# Patient Record
Sex: MALE | Race: WHITE | NOT HISPANIC OR LATINO | Employment: FULL TIME | URBAN - METROPOLITAN AREA
[De-identification: names, ages, dates, MRNs, and addresses within clinical notes are randomized per-mention and may not be internally consistent; named-entity substitution may affect disease eponyms.]

---

## 2017-01-04 ENCOUNTER — COMMUNICATION - HEALTHEAST (OUTPATIENT)
Dept: FAMILY MEDICINE | Facility: CLINIC | Age: 52
End: 2017-01-04

## 2017-02-04 ENCOUNTER — COMMUNICATION - HEALTHEAST (OUTPATIENT)
Dept: FAMILY MEDICINE | Facility: CLINIC | Age: 52
End: 2017-02-04

## 2017-02-08 ENCOUNTER — COMMUNICATION - HEALTHEAST (OUTPATIENT)
Dept: FAMILY MEDICINE | Facility: CLINIC | Age: 52
End: 2017-02-08

## 2017-03-20 ENCOUNTER — AMBULATORY - HEALTHEAST (OUTPATIENT)
Dept: FAMILY MEDICINE | Facility: CLINIC | Age: 52
End: 2017-03-20

## 2017-03-20 ENCOUNTER — OFFICE VISIT - HEALTHEAST (OUTPATIENT)
Dept: FAMILY MEDICINE | Facility: CLINIC | Age: 52
End: 2017-03-20

## 2017-03-20 ENCOUNTER — RECORDS - HEALTHEAST (OUTPATIENT)
Dept: GENERAL RADIOLOGY | Facility: CLINIC | Age: 52
End: 2017-03-20

## 2017-03-20 ENCOUNTER — COMMUNICATION - HEALTHEAST (OUTPATIENT)
Dept: FAMILY MEDICINE | Facility: CLINIC | Age: 52
End: 2017-03-20

## 2017-03-20 DIAGNOSIS — R05.9 COUGH: ICD-10-CM

## 2017-03-20 DIAGNOSIS — R06.2 WHEEZING: ICD-10-CM

## 2017-03-20 DIAGNOSIS — R07.81 RIB PAIN: ICD-10-CM

## 2017-03-20 DIAGNOSIS — R07.81 PLEURODYNIA: ICD-10-CM

## 2017-03-20 ASSESSMENT — MIFFLIN-ST. JEOR: SCORE: 1460.39

## 2017-04-04 ENCOUNTER — OFFICE VISIT - HEALTHEAST (OUTPATIENT)
Dept: FAMILY MEDICINE | Facility: CLINIC | Age: 52
End: 2017-04-04

## 2017-04-04 DIAGNOSIS — M54.2 NECK PAIN: ICD-10-CM

## 2017-04-04 DIAGNOSIS — M89.8X1 PAIN OF RIGHT SCAPULA: ICD-10-CM

## 2017-04-04 DIAGNOSIS — Z00.00 HEALTH CARE MAINTENANCE: ICD-10-CM

## 2017-04-04 ASSESSMENT — MIFFLIN-ST. JEOR: SCORE: 1469.46

## 2017-05-22 ENCOUNTER — COMMUNICATION - HEALTHEAST (OUTPATIENT)
Dept: FAMILY MEDICINE | Facility: CLINIC | Age: 52
End: 2017-05-22

## 2017-05-22 DIAGNOSIS — M89.8X1 PAIN OF RIGHT SCAPULA: ICD-10-CM

## 2017-07-07 ENCOUNTER — COMMUNICATION - HEALTHEAST (OUTPATIENT)
Dept: FAMILY MEDICINE | Facility: CLINIC | Age: 52
End: 2017-07-07

## 2017-08-02 ENCOUNTER — COMMUNICATION - HEALTHEAST (OUTPATIENT)
Dept: FAMILY MEDICINE | Facility: CLINIC | Age: 52
End: 2017-08-02

## 2017-08-02 DIAGNOSIS — M12.9 ARTHROPATHY: ICD-10-CM

## 2017-08-03 ENCOUNTER — OFFICE VISIT - HEALTHEAST (OUTPATIENT)
Dept: FAMILY MEDICINE | Facility: CLINIC | Age: 52
End: 2017-08-03

## 2017-08-03 DIAGNOSIS — M25.512 LEFT SHOULDER PAIN: ICD-10-CM

## 2017-08-03 DIAGNOSIS — M54.2 CERVICALGIA: ICD-10-CM

## 2017-08-03 DIAGNOSIS — M89.8X1 PAIN OF RIGHT SCAPULA: ICD-10-CM

## 2017-10-09 ENCOUNTER — COMMUNICATION - HEALTHEAST (OUTPATIENT)
Dept: FAMILY MEDICINE | Facility: CLINIC | Age: 52
End: 2017-10-09

## 2017-10-09 DIAGNOSIS — M12.9 ARTHROPATHY: ICD-10-CM

## 2017-12-17 ENCOUNTER — COMMUNICATION - HEALTHEAST (OUTPATIENT)
Dept: FAMILY MEDICINE | Facility: CLINIC | Age: 52
End: 2017-12-17

## 2017-12-17 DIAGNOSIS — M12.9 ARTHROPATHY: ICD-10-CM

## 2018-02-17 ENCOUNTER — COMMUNICATION - HEALTHEAST (OUTPATIENT)
Dept: FAMILY MEDICINE | Facility: CLINIC | Age: 53
End: 2018-02-17

## 2018-02-17 DIAGNOSIS — M12.9 ARTHROPATHY: ICD-10-CM

## 2018-02-17 DIAGNOSIS — M89.8X1 PAIN OF RIGHT SCAPULA: ICD-10-CM

## 2018-04-12 ENCOUNTER — COMMUNICATION - HEALTHEAST (OUTPATIENT)
Dept: FAMILY MEDICINE | Facility: CLINIC | Age: 53
End: 2018-04-12

## 2018-04-12 ENCOUNTER — RECORDS - HEALTHEAST (OUTPATIENT)
Dept: GENERAL RADIOLOGY | Facility: CLINIC | Age: 53
End: 2018-04-12

## 2018-04-12 ENCOUNTER — OFFICE VISIT - HEALTHEAST (OUTPATIENT)
Dept: FAMILY MEDICINE | Facility: CLINIC | Age: 53
End: 2018-04-12

## 2018-04-12 DIAGNOSIS — Z12.11 SCREEN FOR COLON CANCER: ICD-10-CM

## 2018-04-12 DIAGNOSIS — R05.9 COUGH: ICD-10-CM

## 2018-04-12 DIAGNOSIS — R19.7 DIARRHEA, UNSPECIFIED TYPE: ICD-10-CM

## 2018-04-12 DIAGNOSIS — R10.13 EPIGASTRIC PAIN: ICD-10-CM

## 2018-04-12 DIAGNOSIS — R06.2 WHEEZING: ICD-10-CM

## 2018-04-12 LAB
ALBUMIN SERPL-MCNC: 4 G/DL (ref 3.5–5)
ALP SERPL-CCNC: 89 U/L (ref 45–120)
ALT SERPL W P-5'-P-CCNC: 19 U/L (ref 0–45)
ANION GAP SERPL CALCULATED.3IONS-SCNC: 11 MMOL/L (ref 5–18)
AST SERPL W P-5'-P-CCNC: 26 U/L (ref 0–40)
BILIRUB SERPL-MCNC: 1 MG/DL (ref 0–1)
BUN SERPL-MCNC: 13 MG/DL (ref 8–22)
CALCIUM SERPL-MCNC: 9.6 MG/DL (ref 8.5–10.5)
CHLORIDE BLD-SCNC: 102 MMOL/L (ref 98–107)
CO2 SERPL-SCNC: 23 MMOL/L (ref 22–31)
CREAT SERPL-MCNC: 1.24 MG/DL (ref 0.7–1.3)
ERYTHROCYTE [DISTWIDTH] IN BLOOD BY AUTOMATED COUNT: 12.8 % (ref 11–14.5)
FLUAV AG SPEC QL IA: NORMAL
FLUBV AG SPEC QL IA: NORMAL
GFR SERPL CREATININE-BSD FRML MDRD: >60 ML/MIN/1.73M2
GLUCOSE BLD-MCNC: 96 MG/DL (ref 70–125)
HCT VFR BLD AUTO: 44.5 % (ref 40–54)
HGB BLD-MCNC: 15.1 G/DL (ref 14–18)
LIPASE SERPL-CCNC: 59 U/L (ref 0–52)
MCH RBC QN AUTO: 31 PG (ref 27–34)
MCHC RBC AUTO-ENTMCNC: 33.9 G/DL (ref 32–36)
MCV RBC AUTO: 92 FL (ref 80–100)
PLATELET # BLD AUTO: 156 THOU/UL (ref 140–440)
PMV BLD AUTO: 7.7 FL (ref 7–10)
POTASSIUM BLD-SCNC: 4.7 MMOL/L (ref 3.5–5)
PROT SERPL-MCNC: 7.3 G/DL (ref 6–8)
RBC # BLD AUTO: 4.86 MILL/UL (ref 4.4–6.2)
SODIUM SERPL-SCNC: 136 MMOL/L (ref 136–145)
WBC: 4 THOU/UL (ref 4–11)

## 2018-04-12 ASSESSMENT — MIFFLIN-ST. JEOR: SCORE: 1449.96

## 2018-04-16 ENCOUNTER — COMMUNICATION - HEALTHEAST (OUTPATIENT)
Dept: FAMILY MEDICINE | Facility: CLINIC | Age: 53
End: 2018-04-16

## 2018-04-16 ENCOUNTER — AMBULATORY - HEALTHEAST (OUTPATIENT)
Dept: FAMILY MEDICINE | Facility: CLINIC | Age: 53
End: 2018-04-16

## 2018-04-16 DIAGNOSIS — R74.8 ELEVATED LIPASE: ICD-10-CM

## 2018-04-16 DIAGNOSIS — R10.13 EPIGASTRIC PAIN: ICD-10-CM

## 2018-04-17 ENCOUNTER — COMMUNICATION - HEALTHEAST (OUTPATIENT)
Dept: FAMILY MEDICINE | Facility: CLINIC | Age: 53
End: 2018-04-17

## 2018-04-18 ENCOUNTER — COMMUNICATION - HEALTHEAST (OUTPATIENT)
Dept: FAMILY MEDICINE | Facility: CLINIC | Age: 53
End: 2018-04-18

## 2018-04-18 ENCOUNTER — OFFICE VISIT - HEALTHEAST (OUTPATIENT)
Dept: FAMILY MEDICINE | Facility: CLINIC | Age: 53
End: 2018-04-18

## 2018-04-18 DIAGNOSIS — K85.90 PANCREATITIS: ICD-10-CM

## 2018-04-18 DIAGNOSIS — J45.909 ASTHMA: ICD-10-CM

## 2018-04-23 ENCOUNTER — HOSPITAL ENCOUNTER (OUTPATIENT)
Dept: CT IMAGING | Facility: CLINIC | Age: 53
Discharge: HOME | End: 2018-04-23
Attending: NURSE PRACTITIONER

## 2018-04-23 DIAGNOSIS — R74.8 ELEVATED LIPASE: ICD-10-CM

## 2018-04-23 DIAGNOSIS — R10.13 EPIGASTRIC PAIN: ICD-10-CM

## 2018-04-23 LAB — CREAT BLD-MCNC: 0.9 MG/DL

## 2018-04-30 ENCOUNTER — COMMUNICATION - HEALTHEAST (OUTPATIENT)
Dept: FAMILY MEDICINE | Facility: CLINIC | Age: 53
End: 2018-04-30

## 2018-04-30 DIAGNOSIS — M12.9 ARTHROPATHY: ICD-10-CM

## 2018-05-14 ENCOUNTER — AMBULATORY - HEALTHEAST (OUTPATIENT)
Dept: FAMILY MEDICINE | Facility: CLINIC | Age: 53
End: 2018-05-14

## 2018-05-14 ENCOUNTER — COMMUNICATION - HEALTHEAST (OUTPATIENT)
Dept: FAMILY MEDICINE | Facility: CLINIC | Age: 53
End: 2018-05-14

## 2018-05-14 DIAGNOSIS — M79.673 FOOT PAIN: ICD-10-CM

## 2018-05-14 DIAGNOSIS — K90.0 CELIAC DISEASE: ICD-10-CM

## 2018-05-24 ENCOUNTER — RECORDS - HEALTHEAST (OUTPATIENT)
Dept: ADMINISTRATIVE | Facility: OTHER | Age: 53
End: 2018-05-24

## 2018-05-25 ENCOUNTER — RECORDS - HEALTHEAST (OUTPATIENT)
Dept: ADMINISTRATIVE | Facility: OTHER | Age: 53
End: 2018-05-25

## 2018-05-29 ENCOUNTER — RECORDS - HEALTHEAST (OUTPATIENT)
Dept: ADMINISTRATIVE | Facility: OTHER | Age: 53
End: 2018-05-29

## 2018-06-04 ENCOUNTER — COMMUNICATION - HEALTHEAST (OUTPATIENT)
Dept: FAMILY MEDICINE | Facility: CLINIC | Age: 53
End: 2018-06-04

## 2018-06-19 ENCOUNTER — RECORDS - HEALTHEAST (OUTPATIENT)
Dept: ADMINISTRATIVE | Facility: OTHER | Age: 53
End: 2018-06-19

## 2018-07-18 ENCOUNTER — COMMUNICATION - HEALTHEAST (OUTPATIENT)
Dept: FAMILY MEDICINE | Facility: CLINIC | Age: 53
End: 2018-07-18

## 2018-07-18 DIAGNOSIS — R10.13 EPIGASTRIC PAIN: ICD-10-CM

## 2018-08-01 ENCOUNTER — RECORDS - HEALTHEAST (OUTPATIENT)
Dept: ADMINISTRATIVE | Facility: OTHER | Age: 53
End: 2018-08-01

## 2018-08-02 ENCOUNTER — RECORDS - HEALTHEAST (OUTPATIENT)
Dept: ADMINISTRATIVE | Facility: OTHER | Age: 53
End: 2018-08-02

## 2018-08-27 ENCOUNTER — RECORDS - HEALTHEAST (OUTPATIENT)
Dept: ADMINISTRATIVE | Facility: OTHER | Age: 53
End: 2018-08-27

## 2018-08-30 ENCOUNTER — RECORDS - HEALTHEAST (OUTPATIENT)
Dept: ADMINISTRATIVE | Facility: OTHER | Age: 53
End: 2018-08-30

## 2018-10-08 ENCOUNTER — RECORDS - HEALTHEAST (OUTPATIENT)
Dept: ADMINISTRATIVE | Facility: OTHER | Age: 53
End: 2018-10-08

## 2018-11-07 ENCOUNTER — COMMUNICATION - HEALTHEAST (OUTPATIENT)
Dept: FAMILY MEDICINE | Facility: CLINIC | Age: 53
End: 2018-11-07

## 2018-11-07 DIAGNOSIS — R10.13 EPIGASTRIC PAIN: ICD-10-CM

## 2019-01-15 ENCOUNTER — OFFICE VISIT - HEALTHEAST (OUTPATIENT)
Dept: FAMILY MEDICINE | Facility: CLINIC | Age: 54
End: 2019-01-15

## 2019-01-15 DIAGNOSIS — M62.830 BACK MUSCLE SPASM: ICD-10-CM

## 2019-01-15 ASSESSMENT — MIFFLIN-ST. JEOR: SCORE: 1448.14

## 2019-01-16 ENCOUNTER — COMMUNICATION - HEALTHEAST (OUTPATIENT)
Dept: FAMILY MEDICINE | Facility: CLINIC | Age: 54
End: 2019-01-16

## 2019-01-16 DIAGNOSIS — M12.9 ARTHROPATHY: ICD-10-CM

## 2019-01-18 ENCOUNTER — COMMUNICATION - HEALTHEAST (OUTPATIENT)
Dept: FAMILY MEDICINE | Facility: CLINIC | Age: 54
End: 2019-01-18

## 2019-02-13 ENCOUNTER — COMMUNICATION - HEALTHEAST (OUTPATIENT)
Dept: FAMILY MEDICINE | Facility: CLINIC | Age: 54
End: 2019-02-13

## 2019-02-13 DIAGNOSIS — R10.13 EPIGASTRIC PAIN: ICD-10-CM

## 2019-11-07 ENCOUNTER — COMMUNICATION - HEALTHEAST (OUTPATIENT)
Dept: FAMILY MEDICINE | Facility: CLINIC | Age: 54
End: 2019-11-07

## 2019-11-20 ENCOUNTER — COMMUNICATION - HEALTHEAST (OUTPATIENT)
Dept: FAMILY MEDICINE | Facility: CLINIC | Age: 54
End: 2019-11-20

## 2019-11-21 ENCOUNTER — OFFICE VISIT - HEALTHEAST (OUTPATIENT)
Dept: FAMILY MEDICINE | Facility: CLINIC | Age: 54
End: 2019-11-21

## 2019-11-21 DIAGNOSIS — M25.512 CHRONIC LEFT SHOULDER PAIN: ICD-10-CM

## 2019-11-21 DIAGNOSIS — G89.29 CHRONIC LEFT SHOULDER PAIN: ICD-10-CM

## 2019-11-21 DIAGNOSIS — M54.12 CERVICAL RADICULOPATHY: ICD-10-CM

## 2019-11-21 ASSESSMENT — MIFFLIN-ST. JEOR: SCORE: 1465.38

## 2019-12-04 ENCOUNTER — HOSPITAL ENCOUNTER (OUTPATIENT)
Dept: PHYSICAL MEDICINE AND REHAB | Facility: CLINIC | Age: 54
Discharge: HOME | End: 2019-12-04
Attending: PHYSICAL MEDICINE & REHABILITATION

## 2019-12-04 DIAGNOSIS — Z72.820 POOR SLEEP: ICD-10-CM

## 2019-12-04 DIAGNOSIS — M48.02 NEURAL FORAMINAL STENOSIS OF CERVICAL SPINE: ICD-10-CM

## 2019-12-04 DIAGNOSIS — R20.2 LEFT HAND PARESTHESIA: ICD-10-CM

## 2019-12-04 DIAGNOSIS — Z98.1 S/P CERVICAL SPINAL FUSION: ICD-10-CM

## 2019-12-04 DIAGNOSIS — G89.29 CHRONIC LEFT SHOULDER PAIN: ICD-10-CM

## 2019-12-04 DIAGNOSIS — M25.512 CHRONIC LEFT SHOULDER PAIN: ICD-10-CM

## 2019-12-04 DIAGNOSIS — M54.12 CERVICAL RADICULAR PAIN: ICD-10-CM

## 2019-12-04 RX ORDER — AMITRIPTYLINE HYDROCHLORIDE 10 MG/1
TABLET ORAL
Qty: 90 TABLET | Refills: 1 | Status: SHIPPED | OUTPATIENT
Start: 2019-12-04 | End: 2021-09-08

## 2019-12-04 ASSESSMENT — MIFFLIN-ST. JEOR: SCORE: 1448.14

## 2019-12-05 ENCOUNTER — COMMUNICATION - HEALTHEAST (OUTPATIENT)
Dept: PHYSICAL MEDICINE AND REHAB | Facility: CLINIC | Age: 54
End: 2019-12-05

## 2019-12-05 DIAGNOSIS — M79.18 MYOFASCIAL MUSCLE PAIN: ICD-10-CM

## 2019-12-06 ENCOUNTER — COMMUNICATION - HEALTHEAST (OUTPATIENT)
Dept: SCHEDULING | Facility: CLINIC | Age: 54
End: 2019-12-06

## 2019-12-06 DIAGNOSIS — M54.12 CERVICAL RADICULOPATHY: ICD-10-CM

## 2019-12-11 ENCOUNTER — HOSPITAL ENCOUNTER (OUTPATIENT)
Dept: MRI IMAGING | Facility: HOSPITAL | Age: 54
Discharge: HOME | End: 2019-12-11
Attending: PHYSICAL MEDICINE & REHABILITATION

## 2019-12-11 ENCOUNTER — COMMUNICATION - HEALTHEAST (OUTPATIENT)
Dept: PHYSICAL MEDICINE AND REHAB | Facility: CLINIC | Age: 54
End: 2019-12-11

## 2019-12-11 ENCOUNTER — AMBULATORY - HEALTHEAST (OUTPATIENT)
Dept: PHYSICAL MEDICINE AND REHAB | Facility: CLINIC | Age: 54
End: 2019-12-11

## 2019-12-11 DIAGNOSIS — M25.512 CHRONIC LEFT SHOULDER PAIN: ICD-10-CM

## 2019-12-11 DIAGNOSIS — Z98.1 S/P CERVICAL SPINAL FUSION: ICD-10-CM

## 2019-12-11 DIAGNOSIS — M48.02 NEURAL FORAMINAL STENOSIS OF CERVICAL SPINE: ICD-10-CM

## 2019-12-11 DIAGNOSIS — M54.12 CERVICAL RADICULAR PAIN: ICD-10-CM

## 2019-12-11 DIAGNOSIS — M67.912 TENDINOPATHY OF LEFT ROTATOR CUFF: ICD-10-CM

## 2019-12-11 DIAGNOSIS — G89.29 CHRONIC LEFT SHOULDER PAIN: ICD-10-CM

## 2019-12-11 DIAGNOSIS — M99.81 OTHER BIOMECHANICAL LESIONS OF CERVICAL REGION: ICD-10-CM

## 2019-12-16 ENCOUNTER — HOSPITAL ENCOUNTER (OUTPATIENT)
Dept: PHYSICAL MEDICINE AND REHAB | Facility: CLINIC | Age: 54
Discharge: HOME | End: 2019-12-16
Attending: PHYSICAL MEDICINE & REHABILITATION

## 2019-12-16 DIAGNOSIS — M67.912 TENDINOPATHY OF LEFT ROTATOR CUFF: ICD-10-CM

## 2019-12-16 DIAGNOSIS — M54.12 CERVICAL RADICULAR PAIN: ICD-10-CM

## 2019-12-16 DIAGNOSIS — M19.042 OSTEOARTHRITIS OF FINGER OF LEFT HAND: ICD-10-CM

## 2019-12-16 DIAGNOSIS — Z98.1 S/P CERVICAL SPINAL FUSION: ICD-10-CM

## 2019-12-16 DIAGNOSIS — R20.2 LEFT HAND PARESTHESIA: ICD-10-CM

## 2019-12-16 DIAGNOSIS — M48.02 NEURAL FORAMINAL STENOSIS OF CERVICAL SPINE: ICD-10-CM

## 2019-12-16 ASSESSMENT — MIFFLIN-ST. JEOR: SCORE: 1448.14

## 2020-01-03 ENCOUNTER — HOSPITAL ENCOUNTER (OUTPATIENT)
Dept: PHYSICAL MEDICINE AND REHAB | Facility: CLINIC | Age: 55
Discharge: HOME | End: 2020-01-03
Attending: PHYSICAL MEDICINE & REHABILITATION

## 2020-01-03 ENCOUNTER — COMMUNICATION - HEALTHEAST (OUTPATIENT)
Dept: PHYSICAL MEDICINE AND REHAB | Facility: CLINIC | Age: 55
End: 2020-01-03

## 2020-01-03 DIAGNOSIS — Z98.1 S/P CERVICAL SPINAL FUSION: ICD-10-CM

## 2020-01-03 DIAGNOSIS — M99.81 OTHER BIOMECHANICAL LESIONS OF CERVICAL REGION: ICD-10-CM

## 2020-01-03 DIAGNOSIS — M48.02 NEURAL FORAMINAL STENOSIS OF CERVICAL SPINE: ICD-10-CM

## 2020-01-03 DIAGNOSIS — M54.12 CERVICAL RADICULAR PAIN: ICD-10-CM

## 2020-01-18 ENCOUNTER — COMMUNICATION - HEALTHEAST (OUTPATIENT)
Dept: PHYSICAL MEDICINE AND REHAB | Facility: CLINIC | Age: 55
End: 2020-01-18

## 2020-01-18 DIAGNOSIS — M79.18 MYOFASCIAL MUSCLE PAIN: ICD-10-CM

## 2020-01-20 RX ORDER — METHOCARBAMOL 500 MG/1
TABLET, FILM COATED ORAL
Qty: 30 TABLET | Refills: 0 | Status: SHIPPED | OUTPATIENT
Start: 2020-01-20 | End: 2021-09-08

## 2020-02-21 ENCOUNTER — COMMUNICATION - HEALTHEAST (OUTPATIENT)
Dept: FAMILY MEDICINE | Facility: CLINIC | Age: 55
End: 2020-02-21

## 2020-02-21 DIAGNOSIS — R10.13 EPIGASTRIC PAIN: ICD-10-CM

## 2020-02-26 ENCOUNTER — COMMUNICATION - HEALTHEAST (OUTPATIENT)
Dept: SCHEDULING | Facility: CLINIC | Age: 55
End: 2020-02-26

## 2020-03-06 ENCOUNTER — RECORDS - HEALTHEAST (OUTPATIENT)
Dept: ADMINISTRATIVE | Facility: OTHER | Age: 55
End: 2020-03-06

## 2020-05-14 ENCOUNTER — COMMUNICATION - HEALTHEAST (OUTPATIENT)
Dept: FAMILY MEDICINE | Facility: CLINIC | Age: 55
End: 2020-05-14

## 2020-05-14 DIAGNOSIS — M12.9 ARTHROPATHY: ICD-10-CM

## 2020-05-15 ENCOUNTER — COMMUNICATION - HEALTHEAST (OUTPATIENT)
Dept: FAMILY MEDICINE | Facility: CLINIC | Age: 55
End: 2020-05-15

## 2020-05-18 ENCOUNTER — OFFICE VISIT - HEALTHEAST (OUTPATIENT)
Dept: FAMILY MEDICINE | Facility: CLINIC | Age: 55
End: 2020-05-18

## 2020-05-18 DIAGNOSIS — G54.0 LEFT BRACHIAL PLEXITIS: ICD-10-CM

## 2020-05-18 RX ORDER — PREDNISONE 20 MG/1
TABLET ORAL
Status: SHIPPED | COMMUNITY
Start: 2020-05-16 | End: 2021-09-08

## 2020-05-21 ENCOUNTER — COMMUNICATION - HEALTHEAST (OUTPATIENT)
Dept: SCHEDULING | Facility: CLINIC | Age: 55
End: 2020-05-21

## 2020-05-26 ENCOUNTER — HOSPITAL ENCOUNTER (OUTPATIENT)
Dept: PHYSICAL MEDICINE AND REHAB | Facility: CLINIC | Age: 55
Discharge: HOME | End: 2020-05-26
Attending: PHYSICAL MEDICINE & REHABILITATION

## 2020-05-26 DIAGNOSIS — M48.02 NEURAL FORAMINAL STENOSIS OF CERVICAL SPINE: ICD-10-CM

## 2020-05-26 DIAGNOSIS — M54.12 CERVICAL RADICULAR PAIN: ICD-10-CM

## 2020-05-26 DIAGNOSIS — R20.2 LEFT HAND PARESTHESIA: ICD-10-CM

## 2020-05-26 DIAGNOSIS — Z98.1 S/P CERVICAL SPINAL FUSION: ICD-10-CM

## 2020-05-26 RX ORDER — DICYCLOMINE HYDROCHLORIDE 10 MG/1
1 CAPSULE ORAL
Status: ON HOLD | COMMUNITY
Start: 2020-03-06 | End: 2021-10-23

## 2020-09-09 ENCOUNTER — COMMUNICATION - HEALTHEAST (OUTPATIENT)
Dept: PHYSICAL MEDICINE AND REHAB | Facility: CLINIC | Age: 55
End: 2020-09-09

## 2020-09-09 ENCOUNTER — HOSPITAL ENCOUNTER (OUTPATIENT)
Dept: PHYSICAL MEDICINE AND REHAB | Facility: CLINIC | Age: 55
Discharge: HOME | End: 2020-09-09
Attending: PHYSICAL MEDICINE & REHABILITATION

## 2020-09-09 DIAGNOSIS — M67.912 TENDINOPATHY OF LEFT ROTATOR CUFF: ICD-10-CM

## 2020-09-09 DIAGNOSIS — M54.12 CERVICAL RADICULAR PAIN: ICD-10-CM

## 2020-09-09 DIAGNOSIS — M48.02 NEURAL FORAMINAL STENOSIS OF CERVICAL SPINE: ICD-10-CM

## 2020-09-09 DIAGNOSIS — Z98.1 S/P CERVICAL SPINAL FUSION: ICD-10-CM

## 2020-09-09 DIAGNOSIS — M79.18 MYOFASCIAL MUSCLE PAIN: ICD-10-CM

## 2020-09-10 ENCOUNTER — AMBULATORY - HEALTHEAST (OUTPATIENT)
Dept: NEUROSURGERY | Facility: CLINIC | Age: 55
End: 2020-09-10

## 2020-09-10 DIAGNOSIS — M54.2 NECK PAIN: ICD-10-CM

## 2020-09-17 ENCOUNTER — HOSPITAL ENCOUNTER (OUTPATIENT)
Dept: RADIOLOGY | Facility: HOSPITAL | Age: 55
Discharge: HOME | End: 2020-09-17
Attending: NEUROLOGICAL SURGERY

## 2020-09-17 DIAGNOSIS — M54.2 NECK PAIN: ICD-10-CM

## 2020-09-18 ENCOUNTER — COMMUNICATION - HEALTHEAST (OUTPATIENT)
Dept: PHYSICAL MEDICINE AND REHAB | Facility: CLINIC | Age: 55
End: 2020-09-18

## 2020-09-24 ENCOUNTER — OFFICE VISIT - HEALTHEAST (OUTPATIENT)
Dept: NEUROSURGERY | Facility: CLINIC | Age: 55
End: 2020-09-24

## 2020-09-24 DIAGNOSIS — R47.02 DYSPHASIA: ICD-10-CM

## 2020-09-24 DIAGNOSIS — M47.22 OSTEOARTHRITIS OF SPINE WITH RADICULOPATHY, CERVICAL REGION: ICD-10-CM

## 2020-09-24 ASSESSMENT — MIFFLIN-ST. JEOR: SCORE: 1411.86

## 2020-10-03 ENCOUNTER — COMMUNICATION - HEALTHEAST (OUTPATIENT)
Dept: PHYSICAL MEDICINE AND REHAB | Facility: CLINIC | Age: 55
End: 2020-10-03

## 2020-10-03 DIAGNOSIS — M54.12 CERVICAL RADICULAR PAIN: ICD-10-CM

## 2020-10-03 DIAGNOSIS — M67.912 TENDINOPATHY OF LEFT ROTATOR CUFF: ICD-10-CM

## 2020-10-03 DIAGNOSIS — M79.18 MYOFASCIAL MUSCLE PAIN: ICD-10-CM

## 2020-10-05 ENCOUNTER — HOSPITAL ENCOUNTER (OUTPATIENT)
Dept: RADIOLOGY | Facility: HOSPITAL | Age: 55
Discharge: HOME | End: 2020-10-05
Attending: NEUROLOGICAL SURGERY

## 2020-10-16 ENCOUNTER — HOSPITAL ENCOUNTER (OUTPATIENT)
Dept: RADIOLOGY | Facility: HOSPITAL | Age: 55
Discharge: HOME | End: 2020-10-16
Attending: NEUROLOGICAL SURGERY

## 2020-10-24 ENCOUNTER — COMMUNICATION - HEALTHEAST (OUTPATIENT)
Dept: FAMILY MEDICINE | Facility: CLINIC | Age: 55
End: 2020-10-24

## 2020-10-24 ENCOUNTER — COMMUNICATION - HEALTHEAST (OUTPATIENT)
Dept: PHYSICAL MEDICINE AND REHAB | Facility: CLINIC | Age: 55
End: 2020-10-24

## 2020-10-24 DIAGNOSIS — M54.12 CERVICAL RADICULAR PAIN: ICD-10-CM

## 2020-10-24 DIAGNOSIS — M67.912 TENDINOPATHY OF LEFT ROTATOR CUFF: ICD-10-CM

## 2020-10-24 DIAGNOSIS — M79.18 MYOFASCIAL MUSCLE PAIN: ICD-10-CM

## 2020-10-24 DIAGNOSIS — G54.0 LEFT BRACHIAL PLEXITIS: ICD-10-CM

## 2020-11-05 ENCOUNTER — COMMUNICATION - HEALTHEAST (OUTPATIENT)
Dept: FAMILY MEDICINE | Facility: CLINIC | Age: 55
End: 2020-11-05

## 2020-11-05 DIAGNOSIS — R20.2 ARM PARESTHESIA, LEFT: ICD-10-CM

## 2020-11-05 DIAGNOSIS — M79.622 PAIN OF LEFT UPPER ARM: ICD-10-CM

## 2020-11-06 RX ORDER — GABAPENTIN 300 MG/1
300 CAPSULE ORAL 3 TIMES DAILY
Qty: 90 CAPSULE | Refills: 5 | Status: SHIPPED | OUTPATIENT
Start: 2020-11-06 | End: 2021-09-08

## 2020-12-04 ENCOUNTER — COMMUNICATION - HEALTHEAST (OUTPATIENT)
Dept: PHYSICAL MEDICINE AND REHAB | Facility: CLINIC | Age: 55
End: 2020-12-04

## 2020-12-04 DIAGNOSIS — M67.912 TENDINOPATHY OF LEFT ROTATOR CUFF: ICD-10-CM

## 2020-12-04 DIAGNOSIS — M54.12 CERVICAL RADICULAR PAIN: ICD-10-CM

## 2020-12-04 DIAGNOSIS — M79.18 MYOFASCIAL MUSCLE PAIN: ICD-10-CM

## 2020-12-29 ENCOUNTER — COMMUNICATION - HEALTHEAST (OUTPATIENT)
Dept: PHYSICAL MEDICINE AND REHAB | Facility: CLINIC | Age: 55
End: 2020-12-29

## 2020-12-29 DIAGNOSIS — M67.912 TENDINOPATHY OF LEFT ROTATOR CUFF: ICD-10-CM

## 2020-12-29 DIAGNOSIS — M79.18 MYOFASCIAL MUSCLE PAIN: ICD-10-CM

## 2020-12-29 DIAGNOSIS — M54.12 CERVICAL RADICULAR PAIN: ICD-10-CM

## 2021-01-31 ENCOUNTER — COMMUNICATION - HEALTHEAST (OUTPATIENT)
Dept: PHYSICAL MEDICINE AND REHAB | Facility: CLINIC | Age: 56
End: 2021-01-31

## 2021-01-31 DIAGNOSIS — M54.12 CERVICAL RADICULAR PAIN: ICD-10-CM

## 2021-01-31 DIAGNOSIS — M67.912 TENDINOPATHY OF LEFT ROTATOR CUFF: ICD-10-CM

## 2021-01-31 DIAGNOSIS — M79.18 MYOFASCIAL MUSCLE PAIN: ICD-10-CM

## 2021-03-10 ENCOUNTER — COMMUNICATION - HEALTHEAST (OUTPATIENT)
Dept: PHYSICAL MEDICINE AND REHAB | Facility: CLINIC | Age: 56
End: 2021-03-10

## 2021-03-10 ENCOUNTER — COMMUNICATION - HEALTHEAST (OUTPATIENT)
Dept: FAMILY MEDICINE | Facility: CLINIC | Age: 56
End: 2021-03-10

## 2021-03-10 DIAGNOSIS — M79.18 MYOFASCIAL MUSCLE PAIN: ICD-10-CM

## 2021-03-10 DIAGNOSIS — M54.12 CERVICAL RADICULAR PAIN: ICD-10-CM

## 2021-03-10 DIAGNOSIS — R10.13 EPIGASTRIC PAIN: ICD-10-CM

## 2021-03-10 DIAGNOSIS — M67.912 TENDINOPATHY OF LEFT ROTATOR CUFF: ICD-10-CM

## 2021-04-06 ENCOUNTER — COMMUNICATION - HEALTHEAST (OUTPATIENT)
Dept: PHYSICAL MEDICINE AND REHAB | Facility: CLINIC | Age: 56
End: 2021-04-06

## 2021-04-06 DIAGNOSIS — M67.912 TENDINOPATHY OF LEFT ROTATOR CUFF: ICD-10-CM

## 2021-04-06 DIAGNOSIS — M79.18 MYOFASCIAL MUSCLE PAIN: ICD-10-CM

## 2021-04-06 DIAGNOSIS — M54.12 CERVICAL RADICULAR PAIN: ICD-10-CM

## 2021-05-04 ENCOUNTER — COMMUNICATION - HEALTHEAST (OUTPATIENT)
Dept: PHYSICAL MEDICINE AND REHAB | Facility: CLINIC | Age: 56
End: 2021-05-04

## 2021-05-04 DIAGNOSIS — M79.18 MYOFASCIAL MUSCLE PAIN: ICD-10-CM

## 2021-05-04 DIAGNOSIS — M67.912 TENDINOPATHY OF LEFT ROTATOR CUFF: ICD-10-CM

## 2021-05-04 DIAGNOSIS — M54.12 CERVICAL RADICULAR PAIN: ICD-10-CM

## 2021-05-15 ENCOUNTER — HEALTH MAINTENANCE LETTER (OUTPATIENT)
Age: 56
End: 2021-05-15

## 2021-05-27 ENCOUNTER — RECORDS - HEALTHEAST (OUTPATIENT)
Dept: ADMINISTRATIVE | Facility: CLINIC | Age: 56
End: 2021-05-27

## 2021-05-29 ENCOUNTER — RECORDS - HEALTHEAST (OUTPATIENT)
Dept: ADMINISTRATIVE | Facility: CLINIC | Age: 56
End: 2021-05-29

## 2021-05-30 ENCOUNTER — COMMUNICATION - HEALTHEAST (OUTPATIENT)
Dept: PHYSICAL MEDICINE AND REHAB | Facility: CLINIC | Age: 56
End: 2021-05-30

## 2021-05-30 VITALS — WEIGHT: 147.7 LBS | BODY MASS INDEX: 22.39 KG/M2 | HEIGHT: 68 IN

## 2021-05-30 VITALS — HEIGHT: 68 IN | WEIGHT: 145.7 LBS | BODY MASS INDEX: 22.08 KG/M2

## 2021-05-30 DIAGNOSIS — M79.18 MYOFASCIAL MUSCLE PAIN: ICD-10-CM

## 2021-05-30 DIAGNOSIS — M54.12 CERVICAL RADICULAR PAIN: ICD-10-CM

## 2021-05-30 DIAGNOSIS — M67.912 TENDINOPATHY OF LEFT ROTATOR CUFF: ICD-10-CM

## 2021-05-31 VITALS — BODY MASS INDEX: 22.66 KG/M2 | WEIGHT: 149.06 LBS

## 2021-06-01 VITALS — BODY MASS INDEX: 21.73 KG/M2 | HEIGHT: 68 IN | WEIGHT: 143.4 LBS

## 2021-06-01 VITALS — BODY MASS INDEX: 20.92 KG/M2 | WEIGHT: 137.6 LBS

## 2021-06-01 RX ORDER — CYCLOBENZAPRINE HCL 10 MG
TABLET ORAL
Qty: 30 TABLET | Refills: 0 | Status: SHIPPED | OUTPATIENT
Start: 2021-06-01 | End: 2021-09-08

## 2021-06-02 ENCOUNTER — COMMUNICATION - HEALTHEAST (OUTPATIENT)
Dept: FAMILY MEDICINE | Facility: CLINIC | Age: 56
End: 2021-06-02

## 2021-06-02 ENCOUNTER — RECORDS - HEALTHEAST (OUTPATIENT)
Dept: ADMINISTRATIVE | Facility: CLINIC | Age: 56
End: 2021-06-02

## 2021-06-02 VITALS — WEIGHT: 143 LBS | BODY MASS INDEX: 21.67 KG/M2 | HEIGHT: 68 IN

## 2021-06-02 DIAGNOSIS — R10.13 EPIGASTRIC PAIN: ICD-10-CM

## 2021-06-03 NOTE — PROGRESS NOTES
Assessment/Plan:      Diagnoses and all orders for this visit:    Cervical radicular pain  -     MR Cervical Spine Without Contrast; Future; Expected date: 12/04/2019  -     amitriptyline (ELAVIL) 10 MG tablet; 1 tab at bedtime x 5 days.  Then may increase to 2 tabs at bedtime x 5 days, then may increase to 3 caps at bedtime.  Dispense: 90 tablet; Refill: 1    Neural foraminal stenosis of cervical spine  -     MR Cervical Spine Without Contrast; Future; Expected date: 12/04/2019    S/P cervical spinal fusion  -     MR Cervical Spine Without Contrast; Future; Expected date: 12/04/2019    Chronic left shoulder pain  -     MR Shoulder Without Contrast Left; Future; Expected date: 12/04/2019    Left hand paresthesia  -     EMG - Clinic; Future; Expected date: 12/04/2019  -     amitriptyline (ELAVIL) 10 MG tablet; 1 tab at bedtime x 5 days.  Then may increase to 2 tabs at bedtime x 5 days, then may increase to 3 caps at bedtime.  Dispense: 90 tablet; Refill: 1    Poor sleep  -     amitriptyline (ELAVIL) 10 MG tablet; 1 tab at bedtime x 5 days.  Then may increase to 2 tabs at bedtime x 5 days, then may increase to 3 caps at bedtime.  Dispense: 90 tablet; Refill: 1        Assessment: Pleasant 54 y.o. male with a history of kidney stones, gastritis/reflux and irritable bowel,  and status post C4-6 fusion with:    1.  Chronic intermittent neck pain with left arm paresthesias.  Most recent flares over the past 2 months with significant left parascapular pain consistent with cervical radicular pain.  Prior C4-6 fusion with at least moderate foraminal stenosis in the left at C 3-4 and C6-7 both of which could be causing some of his radicular symptoms.    2.  Left shoulder pain.  This is chronic and has decreased internal rotation of the left shoulder.  He has positive impingement signs today as well.  Question rotator cuff pathology versus glenohumeral joint osteoarthritis    3.  Left arm and hand paresthesias with positive  Tinel's at the elbow and wrist.  Question cubital tunnel syndrome with concomitant carpal tunnel syndrome.    4.  Poor sleep related to pain.      Discussion:    1.  I discussed the diagnosis and treatment options.  We discussed the need for further diagnostics along with starting therapy potentially medications or interventions.  He would like some answers as to the cause for his symptoms in general.    2.  MRI cervical spine to evaluate for progression of foraminal stenosis at  C3-4 and C6-7.    3.  MRI left shoulder to evaluate rotator cuff versus glenohumeral joint osteoarthritis.    4.  EMG left upper extremity to evaluate for cubital tunnel syndrome along with carpal tunnel syndrome with cervical radiculopathy.    5.  Trial amitriptyline at bedtime for neuropathic pain and sleep.    6.  We will schedule a follow-up after EMG/MRIs.  I would like to perform the EMG after the MRIs have been completed.      It was our pleasure caring for your patient today, if there any questions or concerns please do not hesitate to contact us.      Subjective:   Patient ID: Jose Boyle is a 54 y.o. male.    History of Present Illness:The patient presents at the request of Niki Diamond for an evaluation of cervical spine pain left parascapular pain shoulder pain and arm paresthesias.  Patient has chronic issues and is status post C4-6 fusion in the past.  Has had intermittent pain in the cervical spine and left shoulder and has several areas of complaints today.  Has had a cervical epidural steroid injection in the past and did well temporarily.  Over the past 2 months left-sided neck and parascapular pain significantly increased with no injury.  He has a pain into the left parascapular region and a sense of weakness in his shoulder.  Pain is worse with any lifting or using his arm as well as looking down or using the computer.  Has not had any recent physical therapy.  Has poor sleep related to pain.  Symptoms seem better  with sitting looking straight ahead.  He feels weakness in his arm in general.  Tried Flexeril but was not helpful and also takes gabapentin unsure if that is helpful.    His chronic pain in the left shoulder as well points laterally to the left shoulder joint worse with using his left arm or lifting.  No specific injury for the shoulder but that is another area of concern for him.  This pain is been present for at least 10 years worse with sleeping on his left side.    Third area is left elbow pain left hand pain with numbness and tingling in the left hand to digits 4 and 5.  Symptoms in the left hand with the paresthesias are intermittent and seem to be worse with his neck but also can be on their own has weakness or pain in the hand and thenar eminence which is using his hand      Imaging: MRI report and images were personally reviewed and discussed with the patient.  A plastic model was utilized during the discussion.  MRI of the cervical spineFrom 2016 personally reviewed.  This shows C4-6 anterior fusion solid.  Significant disc height loss C3-4 and C 6-7.  There is at least moderate foraminal stenosis on the left at those 2 levels as well.  No high-grade central stenosis.    Review of Systems: Complains of eye pain intermittently with headaches, joint pain, itching.  No rashes.  No fevers, weight loss, headaches regularly, change in vision, chest pain, shortness of breath, abdominal pain, nausea, vomiting, bowel or bladder incontinence.  No dizziness.Remainder of 12 point review systems negative unless listed above.    Past Medical History:   Diagnosis Date     Gastritis     alcoholism     Kidney stones      Social history: .  Works as a .  Smokes cigarettes.  Encourage smoking cessation.  Does not drink alcohol.  Previous heavy alcohol use.  13 years sober.    The following portions of the patient's history were reviewed and updated as appropriate: allergies, current medications, past  family history, past medical history, past social history, past surgical history and problem list.      WHO 5: 19    NDI Score: 36      Objective:   Physical Exam:    Vitals:    12/04/19 0738   BP: 113/66   Pulse: 73     Body mass index is 21.74 kg/m .      General:  Well-appearing male in no acute distress.  Pleasant, cooperative, and interactive throughout the examination and interview.  CV: No lower extremity edema on inspection or paltation.  Lymphatics: No cervical lymphadenopathy palpated.  Eyes: sclera clear.  Skin: No rashes or lesions seen over the head/neck, hairline, arms,   trunk.  Respirations unlabored.  MSK: Gait is nonantalgic.   Negative Romberg.  Spine: normal AP curves of the C, T, and L spine.  Decreased range of motion cervical spine in rotation left slightly worse than right.  Full flexion extension.    Palpation: Tenderness to palpation over left parascapular region and cervical thoracic junction.  Extremities: Decreased range of motion left shoulder internal rotation versus the right.  Full range of motion of the   elbows, and wrists with no effusions .  Does have tenderness over the left elbow with positive Tinel's at the elbow and wrist.  Negative arm drop, empty can, and Speed's test bilaterally.  Positive Newton on the left negative right.  Full range of motion of the  knees, and ankles from a seated position with no effusions or tenderness to palpation. No hypermobility of the upper or lower extremities.  Neurologic exam: Mental status: Patient is alert and oriented with normal affect.  Attention, knowledge, memory, and language are intact.  Normal coordination throughout the examination.  Reflexes are 2+ and symmetric biceps, triceps, brachioradialis, patellar, and Achilles with Negative Maureen's.  Sensation is intact to light touch throughout the upper and lower extremities bilaterally.  Manual muscle testing reveals 5 out of 5 strength in the shoulder abductors, elbow  flexors/extensors, wrist extensors, interosseous, and finger flexors; lower extremity strength appears grossly normal.   Normal muscle bulk and tone in the arms and legs.  Positive Spurling's to the left

## 2021-06-03 NOTE — PROGRESS NOTES
Assessment and Plan:     1. Cervical radiculopathy  Ambulatory referral to Spine Care    cyclobenzaprine (FLEXERIL) 5 MG tablet   2. Chronic left shoulder pain  Ambulatory referral to Orthopedics     Discussed that he may have a pinched nerve within his cervical spine.  Will refer to spine clinic for further evaluation and possible EMG.  Discussed symptomatic treatment including rest, ice, stretching activities. Differentials include cervical stenosis, bulging or herniated disc.  Provided prescription for cyclobenzaprine to use as needed.  He is to avoid taking this with other sedatives.  Patient also has chronic left shoulder pain.  Differentials include rotator cuff tendinitis or tear, bursitis, labral tear.  Will refer to orthopedics for further evaluation possible MRI or cortisone injection.  He is content with the plan.    Subjective:     Jose is a 54 y.o. male presenting to the clinic for multiple concerns today.  Patient states over the past month, he has been expensing stiffness within his neck.  He is now having a shooting pain from his neck to his fourth and fifth fingers.  He complains of numbness and tingling within his left arm.  He has been taking gabapentin with no relief.  He also takes Tylenol.  He denies any known injury.  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  Patient has chronic pain within his left shoulder.  He has had the pain for at least 10 years.  He has pain with abduction and external rotation.    Review of Systems: A complete 14 point review of systems was obtained and is negative or as stated in the history of present illness.    Social History     Socioeconomic History     Marital status:      Spouse name: Not on file     Number of children: Not on file     Years of education: Not on file     Highest education level: Not on file   Occupational History     Not on file   Social Needs     Financial resource strain: Not on file     Food  "insecurity:     Worry: Not on file     Inability: Not on file     Transportation needs:     Medical: Not on file     Non-medical: Not on file   Tobacco Use     Smoking status: Current Every Day Smoker     Packs/day: 0.50     Years: 35.00     Pack years: 17.50     Smokeless tobacco: Never Used   Substance and Sexual Activity     Alcohol use: No     Drug use: No     Sexual activity: Not on file   Lifestyle     Physical activity:     Days per week: Not on file     Minutes per session: Not on file     Stress: Not on file   Relationships     Social connections:     Talks on phone: Not on file     Gets together: Not on file     Attends Muslim service: Not on file     Active member of club or organization: Not on file     Attends meetings of clubs or organizations: Not on file     Relationship status: Not on file     Intimate partner violence:     Fear of current or ex partner: Not on file     Emotionally abused: Not on file     Physically abused: Not on file     Forced sexual activity: Not on file   Other Topics Concern     Not on file   Social History Narrative     Not on file       Active Ambulatory Problems     Diagnosis Date Noted     Arthritis      Resolved Ambulatory Problems     Diagnosis Date Noted     No Resolved Ambulatory Problems     Past Medical History:   Diagnosis Date     Gastritis      Kidney stones        Family History   Problem Relation Age of Onset     Cancer Mother      Multiple sclerosis Mother      Heart disease Father      Diabetes Father        Objective:     /68   Pulse 77   Ht 5' 8\" (1.727 m)   Wt 146 lb 12.8 oz (66.6 kg)   SpO2 96%   BMI 22.32 kg/m      Patient is alert, in no obvious distress.   Skin: Warm, dry.    Lungs:  Clear to auscultation. Respirations even and unlabored.  No wheezing or rales noted.   Heart:  Regular rate and rhythm.  No murmurs.   Musculoskeletal: Patient has full range of motion of his neck and shoulders.  There are no areas of erythema, ecchymosis, " signs of trauma.  He is tender to palpation of his left sternocleidomastoid and trapezius muscles.  Biceps, triceps, brachioradialis DTRs +2, symmetrical.  Muscle strength against resistance +5/5 of his upper extremities.

## 2021-06-04 VITALS
HEART RATE: 98 BPM | WEIGHT: 150.4 LBS | OXYGEN SATURATION: 97 % | SYSTOLIC BLOOD PRESSURE: 120 MMHG | DIASTOLIC BLOOD PRESSURE: 80 MMHG | BODY MASS INDEX: 22.87 KG/M2

## 2021-06-04 VITALS — HEIGHT: 68 IN | WEIGHT: 143 LBS | BODY MASS INDEX: 21.67 KG/M2

## 2021-06-04 VITALS
HEIGHT: 68 IN | OXYGEN SATURATION: 96 % | WEIGHT: 146.8 LBS | BODY MASS INDEX: 22.25 KG/M2 | HEART RATE: 77 BPM | DIASTOLIC BLOOD PRESSURE: 68 MMHG | SYSTOLIC BLOOD PRESSURE: 110 MMHG

## 2021-06-04 VITALS — BODY MASS INDEX: 21.67 KG/M2 | HEIGHT: 68 IN | WEIGHT: 143 LBS

## 2021-06-04 NOTE — TELEPHONE ENCOUNTER
We did just get approval for the MRIs today.  I would recommend that he try Tylenol during the day.  Tylenol extended release/8-hour can be helpful.  I can also provide a trial of methocarbamol for some of his muscular pain in place of cyclobenzaprine if he wishes.

## 2021-06-04 NOTE — TELEPHONE ENCOUNTER
"RN Assessment/Reason for Call:   Okay to leave Detailed Message  Jose calling in, needs a refill of flexeril .  It took\" a little edge off\", spine clinic gave him a med  to sleep  Using Tylenol OTC,   Cant take motrin stomach problems.  \"Amitryline doesnt do anything.\"  Taking gapentin; doesn't help much.  2 MRI's and EMG next week    gabapentin (NEURONTIN) 300 MG capsule 270 capsule 3 1/17/2019  No   Sig - Route: Take 1 capsule (300 mg total) by mouth 3 (three) times a day. - Oral   Sent to pharmacy as: gabapentin (NEURONTIN) 300 MG capsule   E-Prescribing Status: Receipt confirmed by pharmacy (1/17/2019      cyclobenzaprine (FLEXERIL) 5 MG tablet 30 tablet 0 11/21/2019  --   Sig - Route: Take 1-2 tablets (5-10 mg total) by mouth 3 (three) times a day as needed. - Oral   Sent to pharmacy as: cyclobenzaprine 5 mg tablet (FLEXERIL)   E-Prescribing Status: Receipt confirmed by pharmacy (11/21/2019  3:19 PM CST)     RN Action/Disposition:  Call back if worse symptoms  Discussed home care measures.  Offered flu immunization.  Agrees to plan.   "

## 2021-06-04 NOTE — TELEPHONE ENCOUNTER
Patient returned call. Results given and explained. Discussed provider wants him to move forward with the ordered EMG as another diagnostic to help determine next steps in treatment plan for his neck/arm. He has placed order for PT for his shoulder pain. Stated understanding.

## 2021-06-04 NOTE — PROGRESS NOTES
Patient presents for left upper extremity EMG.  Has neck pain left parascapular pain.  Has left arm pain with numbness and tingling digits 2- 5.  He has severe pain over the left thenar eminence and base of the thumb.    EMG/NCS  results: Please see scanned full report    Comment NCS: Normal study  1.  Normal nerve conduction studies left upper extremity.    Comment EMG: Normal study  1.  Normal needle EMG left upper extremity.    Interpretation: Normal study    1. There is no electrodiagnostic evidence of cervical radiculopathy, brachial plexopathy, or focal neuropathy in the left upper extremity.    The testing was completed in its entirety by the physician.       It was our pleasure caring for your patient today, if there any questions or concerns please do not hesitate to contact us.

## 2021-06-04 NOTE — TELEPHONE ENCOUNTER
----- Message from Papi Ruffin DO sent at 12/11/2019 11:59 AM CST -----  MRI of the cervical spine reviewed showing progression of the degenerative changes below the fusion with potential nerve impingement.    MRI of the left shoulderAlso reviewed showing moderate rotator cuff tendinopathy/wear and tear.    Recommendations: I would recommend continuing with plan of EMG  For the left arm to evaluate the extent of any nerve compression and I would recommend starting physical therapy for his left shoulder.

## 2021-06-04 NOTE — PROGRESS NOTES
Assessment/Plan:      Diagnoses and all orders for this visit:    Neural foraminal stenosis of cervical spine  -     OPS TFESI Cervical Thoracic 1 Level Uni; Future; Expected date: 12/16/2019    Left hand paresthesia  -     EMG - Clinic; Standing  -     EMG - Clinic    S/P cervical spinal fusion  -     OPS TFESI Cervical Thoracic 1 Level Uni; Future; Expected date: 12/16/2019    Tendinopathy of left rotator cuff    Cervical radicular pain  -     OPS TFESI Cervical Thoracic 1 Level Uni; Future; Expected date: 12/16/2019    Osteoarthritis of finger of left hand        Assessment: Pleasant 54 y.o. male with a history of kidney stones, gastritis/reflux and irritable bowel,  and status post C4-6 fusion with:    1.  Chronic left-sided neck pain with left arm paresthesias and hand paresthesias.  Recent flare over the past 2 months with significant left parascapular pain.  He has severe left foraminal stenosis at C6-7 below his cervical fusion.  Moderate C4-5 left foraminal stenosis above the    2.  Left shoulder pain consistent with rotator cuff tendinopathy.  Moderate on MRI with subdeltoid bursitis.    3.  Left hand pain consistent with left first CMC osteoarthritis.  4.  Poor sleep related to pain.  Improved with amitriptyline      Discussion:    1.  We discussed the MRI of the shoulder and cervical spine at length along with the EMG.  We discussed each diagnosis as well as treatment    2.  For the cervical spine, we discussed the option of cervical transforaminal epidural which he has had in the past and did well with along with returning to the surgeon to surgically correct the severe foraminal stenosis.  He is already scheduled for physical therapy for the shoulder as well as his radicular symptoms.    2.  I recommend proceeding with physical therapy as planned for cervical parascapular strengthening stabilization nerve glides.  This will treat the tendinopathy of the shoulder symptoms along with the cervical  radicular symptoms.    4.  Recommend a left C6-7 transforaminal epidural steroid injection    5.  We discussed the first CMC joint osteoarthritis of the left hand.  We discussed option such as hand therapy along with injections.  At this point he wants to hold off will monitor symptoms for now.    6.  Continue with amitriptyline at night which helps with sleep and neuropathic pain.    7.  Follow-up with Jeanie Pretty 2 to 4 weeks after cervical epidural         It was our pleasure caring for your patient today, if there any questions or concerns please do not hesitate to contact us.      Subjective:   Patient ID: Jose Boyle is a 54 y.o. male.    History of Present Illness: Patient presents for follow-up of cervical spine pain left shoulder pain, left arm pain and paresthesias.  His neck pain into the left parascapular region worse with physical  activity and looking to the left.  He also gets pain down his left arm medial lateral elbow with severe pain numbness and tingling all digits of the left hand.  He has severe pain which is sharp at the base of the left hand and thumb into the thenar eminence.  His pain typically is better with heat and rest.  Pain is a 6/10 today.  He does take gabapentin cyclobenzaprine Tylenol.  Recently started amitriptyline at bedtime taking 20 mg at bedtime the last 2 nights has been sleeping well.  Since his last visit has had an MRI of the cervical spine and shoulder.  Has physical therapy scheduled  to start within the next week.    Imaging: MRI report and images were personally reviewed and discussed with the patient.  A plastic model was utilized during the discussion.  MRI of the cervical spine personally reviewed.  This shows the previous C4-6 fusion.  C3-4 above the fusion moderate disc height loss with moderate left foraminal stenosis.  Progression of the degenerative changes below the fusion at C6-7.  Now severe left and moderate right foraminal stenosis.  No central  stenosis.    MRI of the left shoulder personally reviewed today.  This shows some moderate tendinopathy of the supraspinatus.  Subdeltoid bursitis noted.  No rotator cuff tear.    EMG done today was normal.  Please see full EMG report.    Review of Systems: Numbness and tingling left hand.  He has headaches pain worse at night and coordination problems given the hand pain in the left.  No weakness of the arms.  No bowel or bladder incontinence.  No falls swallowing difficulties fevers weight loss.      Prior interventions:  Left C6-7 transforaminal epidural steroid August 2016.    Past Medical History:   Diagnosis Date     Gastritis     alcoholism     Kidney stones        The following portions of the patient's history were reviewed and updated as appropriate: allergies, current medications, past family history, past medical history, past social history, past surgical history and problem list.           Objective:   Physical Exam:    Vitals:    12/16/19 1438   BP: 121/74   Pulse: 94     Body mass index is 21.74 kg/m .      General: Alert and oriented with normal affect. Attention, knowledge, memory, and language are intact. No acute distress.   Eyes: Sclerae are clear.  Respirations: Unlabored.    Skin: No rashes seen.    Gait:  Nonantalgic  Left shoulder pain with internal rotation.  Near full internal rotation external rotation.  Mild positive Newton on the left negative Neer's.  Tenderness of the left first CMC joint.  Sensation is intact to light touch throughout the upper   extremities.  Reflexes are 2+ and symmetric in the biceps triceps and brachioradialis with negative Hoffmans.      Manual muscle testing reveals:  Right /Left out of 5  5/5 shoulder abductors  5/5 elbow flexors  5/5 elbow extensors  5/5 wrist extensors  5/5 interosseus  5/5 finger flexors

## 2021-06-04 NOTE — TELEPHONE ENCOUNTER
Patient had not read his [employer] message yet. Phone call placed to him to discuss. He would like to try the Methocarbamol. Pharmacy verified.

## 2021-06-04 NOTE — PATIENT INSTRUCTIONS - HE
Please follow up two to four weeks post procedure with Jeanie to evaluate your plan of care.       DISCHARGE INSTRUCTIONS    During office hours (8:00 a.m.- 4:00 p.m.) questions or concerns may be answered  by calling [facility] Navigation Nurses at  [phone].  Messages received after hours will be returned the following business day.      In the case of an emergency, please dial 911 or seek assistance at the nearest Emergency Room/Urgent Care facility.     All Patients:    ? You may experience an increase in your symptoms for the first 2 days (It may take anywhere between 2 days- 2 weeks for the steroid to have maximum effect).    ? You may use ice on the injection site, as frequently as 20 minutes each hour if needed.    ? You may take your pain medicine.    ? You may continue taking your regular medication after your injection. If you have had a Medial Branch Block you may resume pain medication once your pain diary is completed.    ? You may shower. No swimming, tub bath or hot tub for 48 hours.  You may remove your bandaid/bandage as soon as you are home.    ? You may resume light activities, as tolerated.    ? Resume your usual diet as tolerated.    ? It is strongly advised that you do not drive for 1-3 hours post injection.    ? If you have had oral sedation:  Do not drive for 8 hours post injection.      ? If you have had IV sedation:  Do not drive for 24 hours post injection.  Do not operate hazardous machinery or make important personal/business decisions for 24 hours.      POSSIBLE STEROID SIDE EFFECTS (If steroid/cortisone was used for your procedure)    -If you experience these symptoms, it should only last for a short period      Swelling of the legs                Skin redness (flushing)       Mouth (oral) irritation     Blood sugar (glucose) levels              Sweats                      Mood changes    Headache    Sleeplessness         POSSIBLE PROCEDURE SIDE EFFECTS  -Call the Spine Center  if you are concerned    Increased Pain             Increased numbness/tingling        Nausea/Vomiting            Bruising/bleeding at site        Redness or swelling                                                Difficulty walking        Weakness             Fever greater than 100.5    *In the event of a severe headache after an epidural steroid injection that is relieved by lying down, please call the [facility] to speak with a clinical staff member*

## 2021-06-04 NOTE — PATIENT INSTRUCTIONS - HE
1. Continue plan of Physical therapy    2. Continue Amitriptyline at bedtime    [facility] Injection Requirements      A  is required for all fluoroscopically-guided injections.    Injection appointments may be cancelled if there are signs/symptoms of an active infection or if the patient is being actively treated with antibiotics for a diagnosed infection.    Patients may have their steroid injection cancelled if they have had another steroid injection within 2 weeks.    Diabetic patients will have their blood glucose levels checked the day of their injection and the appointment will be rescheduled if the blood glucose level is 300 or higher.    Patients with allergies to cortisone, local anesthetics, iodine, or contrast dye should contact the [facility] Center to further discuss these considerations.    Patients scheduled for medial branch block diagnostic injections should refrain from taking pain medication the day of the procedure.  The medial branch block injection appointment will be rescheduled if the patient's pain rating is not 5/10 or greater at the time of the procedure.    Patients taking warfarin/Coumadin will have their INR checked the day of the procedure and the procedure may be rescheduled if the INR is greater than 3.0.    Please contact the [facility] Center (#[phone]) if you are taking any prescription blood-thinning medications (warfarin, Plavix, Lovenox, Eliquis, Brilinta, Effient, etc.) as special dosing adjustments may need to be made depending on the type of injection you are scheduled to receive.    It is recommended that you delay having your steroid injection if you have received a flu shot or shingles vaccine within 2 weeks.

## 2021-06-05 VITALS
WEIGHT: 135 LBS | HEIGHT: 68 IN | DIASTOLIC BLOOD PRESSURE: 79 MMHG | BODY MASS INDEX: 20.46 KG/M2 | OXYGEN SATURATION: 98 % | HEART RATE: 84 BPM | SYSTOLIC BLOOD PRESSURE: 119 MMHG | RESPIRATION RATE: 16 BRPM

## 2021-06-06 NOTE — TELEPHONE ENCOUNTER
Central PA team  [phone]  Pool: HE PA MED ([id number])          PA has been initiated.       PA form completed and faxed insurance via Cover My Meds     Key:  [id number]  [id number]     Medication:  Omeprazole 20MG dr capsules    Insurance:  [employer] [place]        Response will be received via fax and may take up to 5-10 business days depending on plan

## 2021-06-06 NOTE — TELEPHONE ENCOUNTER
Prior Authorization Request  Who s requesting:  Pharmacy  Pharmacy Name and Location: [facility] # [id number]  Medication Name:   omeprazole (PRILOSEC) 20 MG capsule 30 capsule 3 2/24/2020     Sig: TAKE 1 CAPSULE BY MOUTH ONCE DAILY    Sent to pharmacy as: omeprazole 20 mg capsule,delayed release (PriLOSEC)    E-Prescribing Status: Receipt confirmed by pharmacy (2/24/2020  8:02 AM CST        Insurance Plan: [employer]  Insurance Member ID Number:  [id number]  CoverMyMeds Key: [id number]  Informed patient that prior authorizations can take up to 10 business days for response:   No  Okay to leave a detailed message: No

## 2021-06-07 ENCOUNTER — RECORDS - HEALTHEAST (OUTPATIENT)
Dept: ADMINISTRATIVE | Facility: OTHER | Age: 56
End: 2021-06-07

## 2021-06-08 NOTE — PATIENT INSTRUCTIONS - HE
1.  Start physical therapy for cervical spine and left hand paresthesias.    2.  Monitor symptoms  and if they significantly worsen please contact our office for follow-up    3.  increase gabapentin slowly to 600 mg 3 times daily.  You can use the dosing chart below:      Gabapentin 300mg Dosing Chart    DATE  MORNING AFTERNOON BEDTIME    Day 1 0 0 1    Day 2 0 0 1    Day 3 0 0 1    Day 4 1 0 1    Day 5 1 0 1    Day 6 1 0 1    Day 7 1 1 1    Day 8 1 1 1    Day 9 1 1 1    Day 10 1 1 2    Day 11 1 1 2    Day 12 1 1 2    Day 13 2 1 2    Day 14 2 1 2    Day 15 2 1 2    Day 16 2 2 2    Day 17 2 2 2    Day 18 2 2 2    Day 19 2 2 3    Day 20 2 2 3    Day 21 2 2 3    Day 22 3 2 3    Day 23 3 2 3    Day 24 3 2 3    Day 25 3 3 3    Day 26 3 3 3    Day 27 3 3 3     Continue medication, taking 3 capsules three times daily    Please call if you have any questions regarding how to take your medication  Clinic Phone # [phone]    3.

## 2021-06-08 NOTE — PROGRESS NOTES
"Jose Boyle is a 55 y.o. male who is being evaluated via a billable video visit.      The patient has been notified of following:     \"This video visit will be conducted via a call between you and your physician/provider. We have found that certain health care needs can be provided without the need for an in-person physical exam.  This service lets us provide the care you need with a video conversation.  If a prescription is necessary we can send it directly to your pharmacy.  If lab work is needed we can place an order for that and you can then stop by our lab to have the test done at a later time.    Video visits are billed at different rates depending on your insurance coverage. Please reach out to your insurance provider with any questions.    If during the course of the call the physician/provider feels a video visit is not appropriate, you will not be charged for this service.\"    Patient has given verbal consent to a Video visit? Yes    Patient would like to receive their AVS by AVS Preference: Keron.    Patient would like the video invitation sent by: Text to cell phone: [phone]    Will anyone else be joining your video visit? No        Video Start Time: 10:26 AM    Additional provider notes:   Assessment/Plan:      Diagnoses and all orders for this visit:    Cervical radicular pain  -     Ambulatory referral to Physical Therapy    S/P cervical spinal fusion  -     Ambulatory referral to Physical Therapy    Left hand paresthesia  -     Ambulatory referral to Physical Therapy    Neural foraminal stenosis of cervical spine  -     Ambulatory referral to Physical Therapy        Assessment: Pleasant 55 y.o. male with a history of kidney stones, gastritis/reflux and irritable bowel,  and status post C4-6 fusion with:    1.  Significant flare of left-sided cervical spine parascapular pain and arm pain paresthesias.  This is radicular in nature but also may be related to ulnar neuropathy.  This was " significantly flared 2 weeks ago and symptoms are returning to baseline but he has persistent paresthesias.  He does have severe left foraminal stenosis C6/7 which is below his fusion in my C4-5.      Discussion:    1.  I discussed the diagnosis and treatment options.  His pain is returning to baseline but still has the paresthesias.  We discussed the option of monitoring symptoms, physical therapy, medications.    2.  Start physical therapy which she has not yet done.  Would recommend cervical parascapular strengthening stabilization and nerve glides.    3.  Increase gabapentin which may help with some of his paresthesias and pain.  Increase this slowly to 600 mg 3 times daily    4.  He may continue naproxen as needed for now.    5.  No further injections at this time.  He did have an injection which is quite painful for him in January of this year and will monitor symptoms for now.  We will also monitor the need for repeat EMG if his symptoms persist over the next month.    6.  Follow-up 1 month.  This can be with video visit      It was our pleasure caring for your patient today, if there any questions or concerns please do not hesitate to contact us.      Subjective:   Patient ID: Jose Boyle is a 55 y.o. male.    History of Present Illness: Patient presents for evaluation of cervical spine parascapular pain and left arm pain and paresthesias.  He does have chronic pain in the cervical spine and left parascapular region and left arm.  This significantly worsened after starting a new job.  He started a new job for a couple of days and being in certain positions causes severe increased pain.  He woke in the middle the night with severe neck pain and arm pain from his elbow distally and numbness from the elbow distally digits 3 through 5 which is still fairly constant.  His neck pain was so severe to be present to the emergency department.  I did review the notes.  Was given gabapentin naproxen and prednisone.   Completed the prednisone.  On gabapentin 300 mg 3 times daily feels that it might help some and naproxen he takes which is helpful.  His neck pain is worse with any sitting and also first thing in the morning better with heat.  His neck pain is returning to baseline.    He still has a constant numbness in his left arm from the elbow to digits 3-5.  His pain is now a 5/10 today down from a 10/10 when it was at its worst.  Since his last visit he did not start physical therapy has been awaiting their call given the COVID situation.  He also did have a cervical epidural which he tells me is very painful and helped his neck pain slightly.       Imaging: Personally reviewed the MRI images along with the report.This was from December 11, 2019.  This showed postoperative fusion C4-6.  Progression of the posterior disc osteophyte at C6-7 with mild central stenosis and moderate to severe right and severe left foraminal stenosis.  Mild right foraminal stenosis C4-5 and minimal spinal canal stenosis at C3-4.    Review of Systems: He has numbness and tingling left arm no weakness.  Some difficulty with coordination of the left hand due to numbness.  No numbness, tingling or weakness.  No bowel or bladder incontinence.  No urinary retention.  No fevers, unintentional weight loss, balance changes, headaches, frequent falling, difficulty swallowing .       Past Medical History:   Diagnosis Date     Gastritis     alcoholism     Kidney stones        The following portions of the patient's history were reviewed and updated as appropriate: allergies, current medications, past family history, past medical history, past social history, past surgical history and problem list.           Objective:   Physical Exam:    There were no vitals filed for this visit.  There is no height or weight on file to calculate BMI.        General:  Well-appearing male in no acute distress.  Pleasant,   cooperative, and interactive throughout the  examination and interview.  HEENT: Head atraumatic normocephalic, sclera clear.  Skin: No rashes or lesions seen over the face.  Respirations unlabored.  MSK: Gait is nonantalgic.  Able to heel-toe walk without difficulty.    Range of motion: Mild decreased cervical rotation to the left due to pain.  Full flexion extension.  Minimal positive Spurling's to the left.  Extremities:   Full shoulder abduction bilaterally.     Neurologic exam: Mental status: Patient is alert and oriented with normal affect.  Attention, knowledge, memory, and language are intact.  Normal coordination throughout the examination.     Manual muscle testing : Appears to have at least antigravity strength throughout the upper extremities with normal movements.             Video-Visit Details    Type of service:  Video Visit    Video End Time (time video stopped): 10:46 AM  Originating Location (pt. Location): Home    Distant Location (provider location):  [facility]     Platform used for Video Visit: Ale Ruffin DO

## 2021-06-08 NOTE — PROGRESS NOTES
Assessment:     1. Left brachial plexitis  gabapentin (NEURONTIN) 100 MG capsule       Plan:     1. Left brachial plexitis  We will reinstitute therapy with the following; may titrate him up to 300 mg 2 or 3 times a day    Patient has a physical therapy appointment for this problem which she had to discontinue because of the cold epidemic he will call if physical therapy [place] and see if he can be re-seen or reevaluated again; if he needs a new order I will place same  - gabapentin (NEURONTIN) 100 MG capsule; Take 100 mg by mouth 3 (three) times a day.  Dispense: 90 capsule; Refill: 0      Subjective:   I am seeing Jose who has had intermittent problems with a radiculopathy in his C5-C6-C7 area as well as intermittent problems with his rotator cuff.  He is responded to prednisone with this in the past he is now experiencing pain in his left shoulder left neck pain in his elbow numbness in his right lower extremity hand and especially the ulnar distribution.  I suspect patient may have a radiculopathy which is flaring radiculitis over the C5-C6-C7 on the right side.  He is scheduled for physical therapy had to quit because the COVID epidemic he did a virtual visit over the weekend was given prednisone with no relief.  I reviewed his chart talked with the patient he has had Flexeril; gabapentin and Robaxin in the past.  Best response to this type of problem with the patient's history is been gabapentin.  I restarted him slowly 800 mg 3 times daily; we will reinstitute physical therapy.  If he needs a new order for same we will place it.  He needs to follow-up here in primary care 30 to 60 days to see if he has improved otherwise MRI needs to be repeated and patient will need referral to neurology neurosurgery.    Review of Systems: A complete 14 point review of systems was obtained and is negative or as stated in the history of present illness.    Past Medical History:   Diagnosis Date     Gastritis      alcoholism     Kidney stones      Family History   Problem Relation Age of Onset     Cancer Mother      Multiple sclerosis Mother      Heart disease Father      Diabetes Father      No past surgical history on file.  Social History     Tobacco Use     Smoking status: Current Every Day Smoker     Packs/day: 0.50     Years: 35.00     Pack years: 17.50     Smokeless tobacco: Never Used   Substance Use Topics     Alcohol use: No     Drug use: No         Objective:   /80 (Patient Site: Right Arm, Patient Position: Sitting, Cuff Size: Adult Regular)   Pulse 98   Wt 150 lb 6.4 oz (68.2 kg)   SpO2 97%   BMI 22.87 kg/m      General Appearance:  Alert, cooperative, no distress  Head:  Normocephalic, no obvious abnormality  Ears: TM anatomy normal  Eyes:  PERRL, EOM's intact, conjunctiva and corneas clear  Nose:  Nares symmetrical, septum midline, mucosa pink, no sinus tenderness  Throat:  Lips, tongue, and mucosa are moist, pink, and intact  Neck:  Supple, symmetrical, trachea midline, no adenopathy; thyroid: no enlargement, symmetric,no tenderness/mass/nodules; no carotid bruit, no JVD  Back:  Symmetrical, no curvature, ROM normal, no CVA tenderness  Chest/Breast:  No mass or tenderness  Lungs:  Clear to auscultation bilaterally, respirations unlabored   Heart:  Normal PMI, regular rate & rhythm, S1 and S2 normal, no murmurs, rubs, or gallops  Abdomen:  Soft, non-tender, bowel sounds active all four quadrants, no mass, or organomegaly  Musculoskeletal:  Tone and strength strong and symmetrical, all extremities patient has paresthesias in his distribution of C6-C7 beginning at the shoulder area going down into his hand suspect brachial plexitis or radiculitis C5-C6-C7  Lymphatic:  No adenopathy  Skin/Hair/Nails:  Skin warm, dry, and intact, no rashes  Neurologic:  Alert and oriented x3, no cranial nerve deficits, normal strength and tone, gait steady  Extremities:  No edema.  Fabian's sign negative.    Genitourinary:  deferred  Pulses:  Equal bilaterally     I have had an Advance Directives discussion with the patient.      This note has been dictated using voice recognition software. Any grammatical or context distortions are unintentional and inherent to the the software.

## 2021-06-08 NOTE — TELEPHONE ENCOUNTER
Jose has a history of back problems.  Since Thursday one week ago is having severe pain in left shoulder blade and goes to elbow and arm.  Numbness and tingling is present.  Jose was seen on Monday and given Gabapentin and it is not helping.  Cannot hold phone or turn pages.  Denies fever, cough and shortness of breath.  Currently Jose cannot get rings off his fingers due to swelling.      COVID 19 Nurse Triage Plan/Patient Instructions    Please be aware that novel coronavirus (COVID-19) may be circulating in the community. If you develop symptoms such as fever, cough, or SOB or if you have concerns about the presence of another infection including coronavirus (COVID-19), please contact your health care provider or visit [email or web address].     Disposition/Instructions    Patient to go to ED and follow protocol based instructions. Follow System Ambulatory Workflow for COVID 19.     Bring Your Own Device:  Please also bring your smart device(s) (smart phones, tablets, laptops) and their charging cables for your personal use and to communicate with your care team during your visit.      Thank you for limiting contact with others, wearing a simple mask to cover your cough, practice good hand hygiene habits and accessing our virtual services where possible to limit the spread of this virus.    For more information about COVID19 and options for caring for yourself at home, please visit the CDC website at https://www.cdc.gov/coronavirus/2019-ncov/about/steps-when-sick.html  For more options for care at [facility], please visit our website at [email or web address]    For more information, please use the [place] Department of Health COVID-19 Website: https://www.health.state.[place]/diseases/coronavirus/index.html  [place] Department of Health ([facility]) COVID-19 Hotlines (Interpreters available):      Health questions: Phone Number: [phone] or [phone] and Hours: 7 a.m. to 7  p.m.    Schools and  questions: Phone Number: [phone] or [phone] and Hours 7 a.m. to 7 p.m.                        Reason for Disposition    [1] SEVERE pain AND [2] not improved 2 hours after pain medicine    Protocols used: SHOULDER PAIN-A-AH

## 2021-06-08 NOTE — TELEPHONE ENCOUNTER
Refill Approved    Rx renewed per Medication Renewal Policy. Medication was last renewed on 1/17/19.    Tatum Rosenthal, [facility] Triage/Med Refill 5/18/2020     Requested Prescriptions   Pending Prescriptions Disp Refills     gabapentin (NEURONTIN) 300 MG capsule [Pharmacy Med Name: Gabapentin 300 MG Oral Capsule] 90 capsule 0     Sig: TAKE 1 CAPSULE BY MOUTH THREE TIMES DAILY       Gabapentin/Levetiracetam/Tiagabine Refill Protocol  Passed - 5/14/2020  3:59 PM        Passed - PCP or prescribing provider visit in past 12 months or next 3 months     Last office visit with prescriber/PCP: 12/12/2016 Trish Munoz MD OR same dept: 11/21/2019 Niki Diamond CNP OR same specialty: 11/21/2019 Niki Diamond CNP  Last physical: Visit date not found Last MTM visit: Visit date not found   Next visit within 3 mo: Visit date not found  Next physical within 3 mo: Visit date not found  Prescriber OR PCP: Trish Munoz MD  Last diagnosis associated with med order: 1. Arthritis  - gabapentin (NEURONTIN) 300 MG capsule [Pharmacy Med Name: Gabapentin 300 MG Oral Capsule]; TAKE 1 CAPSULE BY MOUTH THREE TIMES DAILY  Dispense: 90 capsule; Refill: 0    If protocol passes may refill for 12 months if within 3 months of last provider visit (or a total of 15 months).

## 2021-06-09 NOTE — PROGRESS NOTES
"Jose is a 52 y.o. male presenting to the clinic for concerns for cold symptoms for 4 days.  Patient complains of body aches and a productive cough.  Saturday he developed pain within his bilateral lower rib cage with inhalation.  He also feels the pain when he lays down to sleep.  He complains of wheezing.  He denies shortness of breath, chest tightness, stomachache, nausea, vomiting.  He has had sinus congestion, postnasal drainage, and headache.  He has not tried any over-the-counter products for his symptoms.  His wife is also ill.    Review of Systems: A complete 14 point review of systems was obtained and is negative or as stated in the history of present illness.    Social History     Social History     Marital status:      Spouse name: N/A     Number of children: N/A     Years of education: N/A     Occupational History     Not on file.     Social History Main Topics     Smoking status: Current Every Day Smoker     Packs/day: 0.50     Years: 35.00     Smokeless tobacco: Never Used     Alcohol use No     Drug use: No     Sexual activity: Not on file     Other Topics Concern     Not on file     Social History Narrative       Active Ambulatory Problems     Diagnosis Date Noted     Arthritis      Resolved Ambulatory Problems     Diagnosis Date Noted     No Resolved Ambulatory Problems     Past Medical History:   Diagnosis Date     Gastritis      Kidney stones        Family History   Problem Relation Age of Onset     Cancer Mother      Multiple sclerosis Mother      Heart disease Father      Diabetes Father        OBJECTIVE:     Visit Vitals     /60 (Patient Site: Right Arm, Patient Position: Sitting, Cuff Size: Adult Large)     Pulse 100     Temp 98.5  F (36.9  C)     Ht 5' 8\" (1.727 m)     Wt 145 lb 11.2 oz (66.1 kg)     SpO2 94%     BMI 22.15 kg/m2       Patient is alert, in no obvious distress.   Skin: Warm, dry.  No lesions or rashes.  Skin turgor rapid return.   HEENT:  Head normocephalic, " atraumatic.  Eyes normal. Ears normal.  Nose patent, mucosa red.  Oropharynx mucosa pink.  No lesions or tonsillar enlargement.   Neck: Supple, no lymphadenopathy.   Lungs:  Clear to auscultation. Respirations even and unlabored.  No wheezing or rales noted.   Heart:  Regular rate and rhythm.  No murmurs.   Musculoskeletal: He is tender to palpation of his bilateral lower ribs.    LABORATORY: I ordered and personally reviewed a chest x-ray showing no obvious infiltrate.  Will have radiology review.      ASSESSMENT AND PLAN:     1. Wheezing  predniSONE (DELTASONE) 20 MG tablet   2. Cough  Influenza A/B Rapid Test    XR Chest PA and Lateral   3. Rib pain  XR Chest PA and Lateral     We will treat with wheezing with prednisone.  Discussed symptomatic treatment of viral illness.  He will continue Ventolin as needed.  Suspect muscular strain causing rib pain.  Prednisone may provide assistance with this otherwise recommend over-the-counter anti-inflammatory.  He will follow-up with Dr. Munoz if symptoms persist or worsen.

## 2021-06-09 NOTE — PROGRESS NOTES
"Jose is a 52 y.o. male presenting to the clinic for concerns for right scapular pain since Sunday.  Patient describes the pain as a constant sharp sensation which is exacerbated with movement.  States the pain is radiating into the right side of his neck.  He denies any injury.  He does perform a physical job within .  He has been taking gabapentin and cyclobenzaprine with no relief.  He denies numbness and tingling of his extremities.  He has a history of chronic lumbar and neck pain.    Review of Systems: A complete 14 point review of systems was obtained and is negative or as stated in the history of present illness.    Social History     Social History     Marital status:      Spouse name: N/A     Number of children: N/A     Years of education: N/A     Occupational History     Not on file.     Social History Main Topics     Smoking status: Current Every Day Smoker     Packs/day: 0.50     Years: 35.00     Smokeless tobacco: Never Used     Alcohol use No     Drug use: No     Sexual activity: Not on file     Other Topics Concern     Not on file     Social History Narrative       Active Ambulatory Problems     Diagnosis Date Noted     Arthritis      Resolved Ambulatory Problems     Diagnosis Date Noted     No Resolved Ambulatory Problems     Past Medical History:   Diagnosis Date     Gastritis      Kidney stones        Family History   Problem Relation Age of Onset     Cancer Mother      Multiple sclerosis Mother      Heart disease Father      Diabetes Father        OBJECTIVE:     /62 (Patient Site: Right Arm, Patient Position: Sitting, Cuff Size: Adult Large)  Pulse 75  Ht 5' 8\" (1.727 m)  Wt 147 lb 11.2 oz (67 kg)  SpO2 94%  BMI 22.46 kg/m2    Patient is alert, in no obvious distress.   Skin: Warm, dry.    Lungs:  Clear to auscultation. Respirations even and unlabored.  No wheezing or rales noted.   Heart:  Regular rate and rhythm.  No murmurs.   Musculoskeletal: He has " full range of motion of his neck and bilateral upper extremities.  He is tender to palpation of the right sternocleidomastoid and trapezius muscles.  He most tender point is within the right mid-scapular region. Biceps, triceps, brachioradialis DTRs +2, symmetrical.  Muscle strength equal +5/5.    ASSESSMENT AND PLAN:     1. Pain of right scapula  methocarbamol (ROBAXIN) 500 MG tablet    methylPREDNISolone (MEDROL DOSEPACK) 4 mg tablet   2. Neck pain     3. Health care maintenance  Ambulatory referral for Colonoscopy     Suspect this is muscle tension.  Will treat with methocarbamol as needed and Medrol Dosepak, take as directed.  Educated on its indications and side effects.  Discussed symptomatic treatment including rest, ice.  Will refer to spine clinic or PT if symptoms persist or worsen.  He is content with the plan.

## 2021-06-11 NOTE — PROGRESS NOTES
NEUROSURGERY CONSULTATION NOTE    9/24/2020       CHIEF COMPLAINT: Cervical radiculopathy    HPI:    Jose Boyle is a 55 y.o. male who is sent to us in consultation by Papi Ruffin for further evaluation of left cervical radiculopathy, adjacent segment disease with a history of prior C4-5, C5-6 ACDF.    Onset: 6 months without obvious inciting event  Character: Pain in the lateral left neck radiates into the shoulder where there is an intense heavy pressure and it then spreads down posterior arm through the tricep and it goes into the posterior forearm and into the hand involving all fingers except the thumb  When it gets real bad it will flair once every couple of weeks and when it flairs it will last a couple of days    Numbness/tingling: When there is pain there is some tingling in the same distribution as the pain but resolves when the pain resolves  Weakness: When pain is severe he feels he is not as strong in the left arm as he normally is    Aggravating factors: No obvious triggers; when the pain is bad, he knows that lifting will make his symptoms worse  Relieving factors: Time, heat can be beneficial- will take showers or use a heating pad    Pain management: Took a steroid pack and that did help significantly-within 48 hours he was feeling better. Is taking flexeril and gabapentin PRN- states it takes the edge off    Past Medical History:   Diagnosis Date     Gastritis     alcoholism     Hiatal hernia      Irritable bowel syndrome      Kidney stones      Past Surgical History:   Procedure Laterality Date     ANTERIOR CERVICAL DISCECTOMY W/ FUSION  2005    C4-5, C5-6     PERCUTANEOUS PINNING PHALANX FRACTURE OF HAND           REVIEW OF SYSTEMS:  Pt denies changes in bowel or bladder habits. No incontinence. A full 14 point review of systems was otherwise completed and is negative aside from that mentioned above in the HPI    MEDICATIONS:    Current Outpatient Medications   Medication Sig Dispense  Refill     cyclobenzaprine (FLEXERIL) 10 MG tablet Take 0.5-1 tablets (5-10 mg total) by mouth 2 (two) times a day as needed for muscle spasms. 30 tablet 0     dicyclomine (BENTYL) 10 MG capsule Take 1 capsule by mouth.       gabapentin (NEURONTIN) 300 MG capsule Take 1 capsule (300 mg total) by mouth 3 (three) times a day. 30 capsule 0     MULTIVITAMIN ORAL Take by mouth.       omeprazole (PRILOSEC) 20 MG capsule TAKE 1 CAPSULE BY MOUTH ONCE DAILY 30 capsule 3     amitriptyline (ELAVIL) 10 MG tablet 1 tab at bedtime x 5 days.  Then may increase to 2 tabs at bedtime x 5 days, then may increase to 3 caps at bedtime. 90 tablet 1     methocarbamol (ROBAXIN) 500 MG tablet TAKE 1 TABLET BY MOUTH THREE TIMES DAILY AS NEEDED 30 tablet 0     methylPREDNISolone (MEDROL DOSEPACK) 4 mg tablet Follow package directions 21 tablet 0     naproxen (NAPROSYN) 500 MG tablet Take 1 tablet (500 mg total) by mouth 2 (two) times a day with meals. 20 tablet 0     predniSONE (DELTASONE) 20 MG tablet TAKE 3 TABLETS BY MOUTH ONCE DAILY FOR 3 DAYS THEN 2 ONCE DAILY FOR 3 DAYS THEN 1 ONCE DAILY FOR 3 DAYS THEN 1 2 (ONE HALF) ONCE DAILY FOR 2       No current facility-administered medications for this visit.          ALLERGIES/SENSITIVITIES:     Allergies   Allergen Reactions     Ibuprofen Nausea Only       PERTINENT SOCIAL HISTORY:   Social History     Socioeconomic History     Marital status:      Spouse name: None     Number of children: None     Years of education: None     Highest education level: None   Occupational History     None   Social Needs     Financial resource strain: None     Food insecurity     Worry: None     Inability: None     Transportation needs     Medical: None     Non-medical: None   Tobacco Use     Smoking status: Current Every Day Smoker     Packs/day: 0.50     Years: 35.00     Pack years: 17.50     Smokeless tobacco: Never Used   Substance and Sexual Activity     Alcohol use: No     Drug use: No     Sexual  "activity: None   Lifestyle     Physical activity     Days per week: None     Minutes per session: None     Stress: None   Relationships     Social connections     Talks on phone: None     Gets together: None     Attends Jainism service: None     Active member of club or organization: None     Attends meetings of clubs or organizations: None     Relationship status: None     Intimate partner violence     Fear of current or ex partner: None     Emotionally abused: None     Physically abused: None     Forced sexual activity: None   Other Topics Concern     None   Social History Narrative     None         FAMILY HISTORY:  Family History   Problem Relation Age of Onset     Cancer Mother      Multiple sclerosis Mother      Heart disease Father      Diabetes Father         PHYSICAL EXAM:     Constitution: /79   Pulse 84   Resp 16   Ht 5' 8\" (1.727 m)   Wt 135 lb (61.2 kg)   SpO2 98%   BMI 20.53 kg/m  .   Awake, alert and in NAD  Eyes: Conjugate gaze. Conjunctiva benign without icterus or injection  Heart: RRR  Lungs: Non-labored respiration without accessory muscle use  Skin: No obvious rash or lesion  Psych: Appropriate mood and affect, alert and oriented x 3  Mental Status:  Speech is fluent.  Recent and remote memory are intact.  Attention span and concentration are normal.     Motor: Normal bulk and tone all muscle groups of upper and lower extremities.     Right Left  Right Left   Deltoid 5 5 Hip flexion 5 5   Biceps 5 5 Hip extension 5 5   Triceps 5 5 Knee flexion 5 5   Wrist ex 5 5 Knee ex 5 5   Wrist flex 5 5 Dorsiflex 5 5   Finger ex 4+ 4+ Plantar flex 5 5   Hand intrinsic 5 5 EHL 5 5    Full Full         Sensory: Sensation intact bilaterally to light touch and temperature throughout. Vibratory sense is intact in the bl great toe.     Coordination:  Gait is WNL. Pt is able to heel and toe walk without difficulty. Tandem gait is WNL. WAN in the UE is WNL     Reflexes; 2+ supinator, biceps, absent " triceps. 2+ patellar and achilles. No clonus. Toes are down-going bilaterally    Musculoskeletal: Negative straight leg raise bilaterally.     IMAGING: I personally reviewed all radiographic images    MRI cervical spine 12/11/2018: Patient has evidence of prior C4-5, C5-6 ACDF with solid-appearing fusion.  He has evidence of adjacent segment disease with advanced disc degeneration at C3-4, C6-7 and less so at C2-3.  He has a congenitally narrowed cervical spinal canal.  At C3-4, there is a broad-based paracentral disc osteophyte complex with uncovertebral joint hypertrophy with some indentation of the ventral thecal sac without obvious deformation of the cervical cord cervical spinal canal diameter has been reduced to approximately 10 mm.  At C6-7, there is a broad-based disc osteophyte complex, no evidence of significant central canal stenosis however patient does have evidence of bilateral foraminal stenosis which is severe on the left and moderate to severe on the right.      CONSULTATION ASSESSMENT AND PLAN:    Jose Boyle is a 55 y.o. male with a past medical history significant for tobacco abuse who is a history of prior C4-5, C5-6 ACDF now with adjacent segment disease with C6-7 bilateral foraminal stenosis and signs and symptoms of left cervical radiculopathy     I reviewed Jose's imaging with him today in clinic noting he has adjacent segment disease at C6-7 where he has severe foraminal stenosis bilaterally.  We discussed options for surgical intervention including extension of his fusion with a C6-7 ACDF.  As we were reviewing the risks, benefits, alternatives to surgery, Jose noted that he does have a history of chronic dysphasia ever since his initial anterior cervical fusion.  I recommended that he have a formal evaluation with a swallow study to see if he is truly having dysphasia versus just a globus sensation.  I noted that if he has true dysphasia, and we do additional anterior cervical  fusion surgery, there is a distinct possibility that I could make that worse either temporarily or perhaps more long-term.  He is in agreement with this.  He would like to continue with his anticipated physical therapy.  We have the results of his imaging, we can discuss options further regarding surgical intervention.    I spent more than 60 minutes in this apt, examining the pt, reviewing the scans, reviewing notes from chart, discussing treatment options with risks and benefits and coordinating care. >50 % clinic time was spent in face to face counseling and coordinating care    Cora Almanza MD      Cc:   Trish Munoz MD

## 2021-06-11 NOTE — PROGRESS NOTES
Neurosurgery consultation was requested by: Dr. Ruffin   Pain: Neck pain   Radicular Pain is present: Left arm   Lhermitte sign: No   Motor complaints: None   Sensory complaints: Intermittent numbness tingling in left arm/hand   Gait and balance issues: No  Bowel or bladder issues: No  Duration of SX is: 6 months   The symptoms are worse with: Over exertion   The symptoms are better with: Medication   Injury: No  Severity is:   Patient has tried the following conservative measures: None, PT starts next week   Ninfa Crowell CMA,2:07 PM

## 2021-06-11 NOTE — TELEPHONE ENCOUNTER
I recommend that he do his best to manage his pain with the Flexeril and Tylenol.  I would like to avoid more steroids so close to the last Medrol Dosepak if he is able.

## 2021-06-11 NOTE — PROGRESS NOTES
Assessment/Plan:      Diagnoses and all orders for this visit:    Cervical radicular pain  -     Ambulatory referral to Physical Therapy  -     methylPREDNISolone (MEDROL DOSEPACK) 4 mg tablet; Follow package directions  Dispense: 21 tablet; Refill: 0  -     Ambulatory referral to Neurosurgery  -     cyclobenzaprine (FLEXERIL) 10 MG tablet; Take 0.5-1 tablets (5-10 mg total) by mouth 2 (two) times a day as needed for muscle spasms.  Dispense: 30 tablet; Refill: 0    Neural foraminal stenosis of cervical spine  -     Ambulatory referral to Physical Therapy  -     methylPREDNISolone (MEDROL DOSEPACK) 4 mg tablet; Follow package directions  Dispense: 21 tablet; Refill: 0  -     Ambulatory referral to Neurosurgery    S/P cervical spinal fusion  -     Ambulatory referral to Physical Therapy  -     Ambulatory referral to Neurosurgery    Tendinopathy of left rotator cuff  -     Ambulatory referral to Physical Therapy  -     methylPREDNISolone (MEDROL DOSEPACK) 4 mg tablet; Follow package directions  Dispense: 21 tablet; Refill: 0  -     cyclobenzaprine (FLEXERIL) 10 MG tablet; Take 0.5-1 tablets (5-10 mg total) by mouth 2 (two) times a day as needed for muscle spasms.  Dispense: 30 tablet; Refill: 0    Myofascial muscle pain  -     cyclobenzaprine (FLEXERIL) 10 MG tablet; Take 0.5-1 tablets (5-10 mg total) by mouth 2 (two) times a day as needed for muscle spasms.  Dispense: 30 tablet; Refill: 0        Assessment: Pleasant 55 y.o. male with a history of kidney stones, gastritis/reflux and irritable bowel,  and status post C4-6 fusion with:    1.  3-day history of severe flare of left shoulder pain with numbness and tingling in the left hand intermittently.  He has left-sided cervical spine pain and parascapular pain as well.  Symptoms are most consistent with a left C7 radiculopathy and he does have severe foraminal stenosis on the left at C6-7 below his fusion.  This could also represent some left shoulder pathology as he  does have some pain with Newton today and has moderate tendinopathy of the left shoulder on MRI, but the vast majority of his pain is most consistent with cervical radicular pain.    2.  Left shoulder tendinopathy    3.  Myofascial pain cervical spine parascapular region.      Discussion:    1.  We discussed the diagnosis and treatment options.  Discussed the option of therapy along with further injections which she did not enjoy in the cervical spine, neurosurgical evaluation medications.  Cautiously start conservatively.    2.  Start physical therapy for cervical parascapular strengthening shoulder strengthening and range of motion.    3.  Trial Medrol Dosepak for acute pain.    4.  Will provide course of cyclobenzaprine for him for myofascial component of the pain.    5.  He does have severe foraminal stenosis below his fusion.  He would like to get a surgical opinion.  Referral placed to neurosurgery.    6.  Follow-up with me in 1 month.  Can consider subacromial bursa injection to help with any associated shoulder pain if he continues to have pain in the shoulder.      It was our pleasure caring for your patient today, if there any questions or concerns please do not hesitate to contact us.      Subjective:   Patient ID: Jose Boyle is a 55 y.o. male.    History of Present Illness:Patient presents with his significant other today for evaluation of left shoulder pain neck pain and hand numbness and tingling.  Has been doing fairly well for the last several months.  Awoke 3 days ago with severe pain left shoulder.  No trauma.  He has pain in the shoulder radiating up to the left cervical spine mid cervical spine rated 10/10 at worst 8/10 today 0/10 at best.  Sharp pain.  He has numbness and tingling digits 2 through 4 of the left hand.  Sometimes on the right as well.  Worse with any movement of his shoulder or neck.  Better with heat.  He has been working a new job for the past 2 months.  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  He does a lot of heavy lifting.  He has not yet started physical therapy from the last visit due to COVID.  Does take gabapentin daily.  He did have an injection in his cervical spine earlier this year but it was not a pleasant process for him and he is not interested in further injections if possible.      Imaging: MRI report and images were personally reviewed and discussed with the patient.  A plastic model was utilized during the discussion.  MRI of the cervical spine personally reviewed.  He has a C4-6 fusion.  Severe degenerative disc disease below and above the fusion with foraminal stenosis on the left at severe at C6-7       Left shoulder MRI reviewed showing moderate tendinopathy with subacromial bursitis mild AC joint osteoarthritis and a low-grade articular sided tear of the supraspinatus.    Review of Systems: Pertinent positives: Numbness tingling weakness of the left shoulder and arm. pertinent negatives:   No bowel or bladder incontinence.  No urinary retention.  No fevers, unintentional weight loss, balance changes, headaches, frequent falling, difficulty swallowing, or coordination difficulties.  All others reviewed are negative.         Past Medical History:   Diagnosis Date     Gastritis     alcoholism     Kidney stones        The following portions of the patient's history were reviewed and updated as appropriate: allergies, current medications, past family history, past medical history, past social history, past surgical history and problem list.           Objective:   Physical Exam:    Vitals:    09/09/20 1503   BP: 102/60     There is no height or weight on file to calculate BMI.      General: Alert and oriented with normal affect. Attention, knowledge, memory, and language are intact. No acute distress.   Eyes: Sclerae are clear.  Respirations: Unlabored.  Skin: No rashes seen.    Gait:  Nonantalgic  Hypertonic tissue textures  left cervical paraspinals upper trapezius.  He had cervical spine pain with rotation bilaterally with mild decreased rotation to the left.  Positive Spurling's on the left.  He also has decreased internal rotation of the left shoulder with pain and positive Newton on the left for shoulder pain isolated to the shoulder.  Sensation is intact to light touch throughout the upper   extremities.  Reflexes are 2+ and symmetric in the biceps triceps and brachioradialis with negative Hoffmans     Manual muscle testing reveals:  Right /Left out of 5  5/5 shoulder abductors  5/5 elbow flexors  5/5 elbow extensors  5/5 wrist extensors  5/5 interosseus  5/5 finger flexors

## 2021-06-11 NOTE — TELEPHONE ENCOUNTER
I would recommend that he continue to monitor his symptoms over the weekend Flexeril if that is helpful.  If he is able to take anti-inflammatory, he can try Aleve over-the-counter or I can provide a trial of nabumetone for him.

## 2021-06-11 NOTE — TELEPHONE ENCOUNTER
"Patient had called and left message on nurse navigation line regarding return of neck and shoulder pain.     Returned patients call to further discuss. Reports he had 90-95% relief of pain with the Medrol dosepak and Flexeril. He noted the pain starting to return yesterday. \"Pain is back to what it was when I saw him last week. It has been hanging around a 8-9/10 all day.\" Is taking 10 mg Flexeril as prescribed. Pharmacy verified.     Patient is scheduled to see neurosurgery on 9/24; was rescheduled from yesterday as MD in surgery.   "

## 2021-06-11 NOTE — TELEPHONE ENCOUNTER
"Phone call to pt to discuss PSP's response. Patient reports anti-inflammatory medications \"wreck my stomach. I have irritable bowel or something.\" Encouraged to take 2 ES Tylenol every 6-8 hours and Flexeril as prescribed. \"OK thanks. I guess I'll just maintain until I see the surgeon.\"  "

## 2021-06-11 NOTE — PATIENT INSTRUCTIONS - HE
Please complete Swallow Study.  You will be contacted you with results, and to discuss next steps.   Physical Therapy ordered.

## 2021-06-11 NOTE — PATIENT INSTRUCTIONS - HE
1. Medrol dose pack for pain    2. Try cyclobenzaprine as needed for muscle pain    3. Start physical therapy    4. See neurosurgery to discuss stenosis below fusion

## 2021-06-12 NOTE — PROGRESS NOTES
Assessment & Plan:  1. Cervicalgia  Ambulatory referral to [facility] Care    methylPREDNISolone (MEDROL DOSEPACK) 4 mg tablet   2. Left shoulder pain  Ambulatory referral to [facility]   3. Pain of right scapula  Ambulatory referral to [facility]    methocarbamol (ROBAXIN) 500 MG tablet     Given acute nature of symptoms we will have patient take Medrol Dosepak again.  He has had positive results from this in the short-term previously.  We will also refer her back to spine center for further evaluation and physical therapy.  He can decide from there whether they would like to pursue more imaging at this time or not.  Patient in agreement with the plan.  Also concerned he may have some left rotator cuff pathology, recommend he discuss this with spine center and they can refer her on if not able to address this within their visit.    There are no Patient Instructions on file for this visit.    Orders Placed This Encounter   Procedures     Ambulatory referral to Spine Care     Referral Priority:   Routine     Referral Type:   Consultation     Referral Reason:   Evaluation and Treatment     Number of Visits Requested:   1     Medications Discontinued During This Encounter   Medication Reason     methocarbamol (ROBAXIN) 500 MG tablet Reorder     methylPREDNISolone (MEDROL DOSEPACK) 4 mg tablet Reorder         Chief Complaint:   Chief Complaint   Patient presents with     Neck Pain     c/o upper neck pain into the shoulders x 4-5 days     Back Pain     c/o lower back pain x4-5 days       History of Present Illness:  Jose is a 52-year-old patient here today to discuss ongoing cervical back pain, left shoulder pain and right scapular pain.  He has a history of cervical fusion at C4-C6 many years ago and has had ongoing pain intermittently since then.  This is been worsening in the past year and especially in the past several months.  He was seen last in April for similar concern and given methocarbamol and a pregnant zone pack  to take.  This improved symptoms for a time but symptoms have worsened again.  He also had a recent MRI about a year ago which did not show any impingement, however did show some degenerative changes in the cervical spine.  He has seen the spine center in the past with good effect but is frustrated that pain is now increasing.  At the very least he is interested in physical therapy and is wondering if there are any medications we can use today to help with pain relief for the time being.  Symptoms include left shoulder pain deep within the joint, he does notice pain and limitation with overhead raise.  He has some concern given his work history that he may have injured the rotator cuff at some point or another.  This has been worsening over the past year or so.  He also complains of right-sided scapular shoulder pain, is also occasionally occurs on the left.  Lastly he has pain in the upper trapezius bilaterally, states it feels like there is someone gripping over the top of his shoulders and squeezing for the better part of each day.    Review of Systems:  All other systems are negative except as noted above.    PFSH:  Reviewed, updated.     Tobacco Use:  History   Smoking Status     Current Every Day Smoker     Packs/day: 0.50     Years: 35.00   Smokeless Tobacco     Never Used       Vitals:  Vitals:    08/03/17 1452   BP: 110/60   Patient Site: Right Arm   Patient Position: Sitting   Cuff Size: Adult Regular   Pulse: 72   Resp: 14   Temp: 97.9  F (36.6  C)   TempSrc: Oral   Weight: 149 lb 1 oz (67.6 kg)     Wt Readings from Last 3 Encounters:   08/03/17 149 lb 1 oz (67.6 kg)   04/04/17 147 lb 11.2 oz (67 kg)   03/20/17 145 lb 11.2 oz (66.1 kg)       Physical Exam:  Constitutional:  Reveals an alert, cooperative, 52 year old male in no acute distress.  Vitals:  Per nursing notes.  Musculoskeletal: Left shoulder: Overhead raise is limited and painful, rotation only range of motion is normal.  Patient does have pain  to palpation over the AC joint of the left shoulder as well.,  Right shoulder range of motion is intact, no pain with movement.  Pain in the trapezius bilaterally to palpation from the neck across to the scapula especially.  Muscles also feel quite tight in this area.  Neurologic:  No gross focal deficits.   Psychiatric:  Mood appropriate, memory intact.     Data Reviewed:  Additional History from Old Records or Another Person Summarized (2 total): None.     Decision to Obtain Extra information (1 total): None.     Radiology Tests Summarized and Ordered (XRAY/CT/MRI/DXA) (1 total): None.    Labs Reviewed and Ordered (1 total): None.    Medicine Tests Summarized and Ordered (EKG/ECHO/COLONOSCOPY/EGD) (1 total): None.    Independent Review of EKG or X-Ray (2 each): None.    The visit lasted a total of 25 minutes face to face with the patient. Over 50% of the time was spent counseling and educating the patient about plan of care.    Medications:  Current Outpatient Prescriptions   Medication Sig Dispense Refill     gabapentin (NEURONTIN) 300 MG capsule Take 1 capsule (300 mg total) by mouth 3 (three) times a day. 90 capsule 2     MULTIVITAMIN ORAL Take by mouth.       cyclobenzaprine (FLEXERIL) 10 MG tablet Take 0.5 tablets (5 mg total) by mouth every 8 (eight) hours as needed for muscle spasms. 30 tablet 0     lidocaine (LIDODERM) 5 % Remove & Discard patch within 12 hours or as directed by MD 30 patch 3     methocarbamol (ROBAXIN) 500 MG tablet TAKE ONE TABLET (500 MG TOTAL) BY MOUTH THREE TIMES DAILY AS NEEDED 30 tablet 0     methylPREDNISolone (MEDROL DOSEPACK) 4 mg tablet follow package directions 21 tablet 0     omeprazole (PRILOSEC) 20 MG capsule Take 1 capsule (20 mg total) by mouth daily. 30 capsule 0     VENTOLIN HFA 90 mcg/actuation inhaler        No current facility-administered medications for this visit.        Total Data Points: 0    ARAM Pastor, CNP    This note has been dictated using voice  recognition software. Any grammatical or context distortions are unintentional and inherent to the software

## 2021-06-12 NOTE — TELEPHONE ENCOUNTER
Refill Approved    Rx renewed per Medication Renewal Policy. Medication was last renewed on 5/21/20.    Tatum Rosenthal, [facility] Triage/Med Refill 11/6/2020     Requested Prescriptions   Pending Prescriptions Disp Refills     gabapentin (NEURONTIN) 300 MG capsule 30 capsule 0     Sig: Take 1 capsule (300 mg total) by mouth 3 (three) times a day.       Gabapentin/Levetiracetam/Tiagabine Refill Protocol  Passed - 11/5/2020  1:07 PM        Passed - PCP or prescribing provider visit in past 12 months or next 3 months     Last office visit with prescriber/PCP: 12/12/2016 Trish Munoz MD OR same dept: 5/18/2020 Sergio Merritt MD OR same specialty: 5/18/2020 Sergio Merritt MD  Last physical: Visit date not found Last MTM visit: Visit date not found   Next visit within 3 mo: Visit date not found  Next physical within 3 mo: Visit date not found  Prescriber OR PCP: Trish Munoz MD  Last diagnosis associated with med order: 1. Pain of left upper arm  - gabapentin (NEURONTIN) 300 MG capsule; Take 1 capsule (300 mg total) by mouth 3 (three) times a day.  Dispense: 30 capsule; Refill: 0    2. Arm paresthesia, left  - gabapentin (NEURONTIN) 300 MG capsule; Take 1 capsule (300 mg total) by mouth 3 (three) times a day.  Dispense: 30 capsule; Refill: 0    If protocol passes may refill for 12 months if within 3 months of last provider visit (or a total of 15 months).

## 2021-06-12 NOTE — TELEPHONE ENCOUNTER
gabapentin (NEURONTIN) 100 MG capsule [[id number]]    Electronically signed by: Sergio Merritt MD on 05/18/20 1054 Status: Discontinued   Ordering user: Sergio Merritt MD 05/18/20 1054 Authorized by: Sergio Merritt MD   Frequency: TID 05/18/20 - 09/09/20  Discontinued by: Demetrius Crouch CMA 09/09/20 1504 [Therapy completed]   Diagnoses   Left brachial plexitis [G54.0]

## 2021-06-14 NOTE — TELEPHONE ENCOUNTER
Pharmacy sent refill request for flexeril   --Med last Rx 12/4/20 #30, 0 refill   --Last OV 9/9/20  --Future appt: none  --Please advise

## 2021-06-15 NOTE — TELEPHONE ENCOUNTER
Refill Approved    Rx renewed per Medication Renewal Policy. Medication was last renewed on 2/24/20.    Randall Doyle, [facility] Connection Triage/Med Refill 3/11/2021     Requested Prescriptions   Pending Prescriptions Disp Refills     omeprazole (PRILOSEC) 20 MG capsule [Pharmacy Med Name: Omeprazole 20 MG Oral Capsule Delayed Release] 30 capsule 0     Sig: Take 1 capsule by mouth once daily       GI Medications Refill Protocol Passed - 3/10/2021  6:10 PM        Passed - PCP or prescribing provider visit in last 12 or next 3 months.     Last office visit with prescriber/PCP: Visit date not found OR same dept: 5/18/2020 Sergio Merritt MD OR same specialty: 5/18/2020 Sergio Merritt MD  Last physical: Visit date not found Last MTM visit: Visit date not found   Next visit within 3 mo: Visit date not found  Next physical within 3 mo: Visit date not found  Prescriber OR PCP: Trish Munoz MD  Last diagnosis associated with med order: 1. Epigastric pain  - omeprazole (PRILOSEC) 20 MG capsule [Pharmacy Med Name: Omeprazole 20 MG Oral Capsule Delayed Release]; TAKE 1 CAPSULE BY MOUTH ONCE DAILY  Dispense: 30 capsule; Refill: 0    If protocol passes may refill for 12 months if within 3 months of last provider visit (or a total of 15 months).

## 2021-06-16 PROBLEM — Z72.0 TOBACCO USER: Status: ACTIVE | Noted: 2018-05-24

## 2021-06-16 PROBLEM — R05.9 COUGH: Status: ACTIVE | Noted: 2020-05-21

## 2021-06-16 PROBLEM — K57.30 DIVERTICULAR DISEASE OF LARGE INTESTINE: Status: ACTIVE | Noted: 2018-08-01

## 2021-06-16 PROBLEM — R19.7 DIARRHEA: Status: ACTIVE | Noted: 2018-05-24

## 2021-06-16 PROBLEM — R10.9 ABDOMINAL PAIN, UNSPECIFIED ABDOMINAL LOCATION: Status: ACTIVE | Noted: 2018-05-24

## 2021-06-16 PROBLEM — K64.9 HEMORRHOIDS: Status: ACTIVE | Noted: 2018-08-01

## 2021-06-16 PROBLEM — K44.9 DIAPHRAGMATIC HERNIA WITHOUT OBSTRUCTION OR GANGRENE: Status: ACTIVE | Noted: 2018-08-01

## 2021-06-16 PROBLEM — K21.9 GASTROESOPHAGEAL REFLUX DISEASE WITHOUT ESOPHAGITIS: Status: ACTIVE | Noted: 2020-05-21

## 2021-06-16 NOTE — TELEPHONE ENCOUNTER
Telephone Encounter by Sarita Tam at 2/27/2020  2:27 PM     Author: Sarita Tam Service: -- Author Type: --    Filed: 2/27/2020  2:28 PM Encounter Date: 2/26/2020 Status: Signed    : Sarita Tam APPROVED:    Approval start date: 02/27/2020  Approval end date:  02/27/2023    Pharmacy has been notified of approval and will contact patient when medication is ready for pickup.

## 2021-06-17 NOTE — PATIENT INSTRUCTIONS - HE
Patient Instructions by Henrietta Holguin MD at 1/15/2019  3:10 PM     Author: Henrietta Holguin MD Service: -- Author Type: Physician    Filed: 1/15/2019  3:17 PM Encounter Date: 1/15/2019 Status: Addendum    : Henrietta Holguin MD (Physician)    Related Notes: Original Note by Henrietta Holguin MD (Physician) filed at 1/15/2019  3:17 PM         Patient Education     Causes of Lumbar (Low Back) Pain  Low back pain can be caused by problems with any part of the lumbar spine. A disk can herniate (push out) and press on a nerve. Vertebrae can rub against each other or slip out of place. This can irritate facet joints and nerves. It can also lead to stenosis, a narrowing of the spinal canal or foramen.  Pressure from a disk  Constant wear and tear on a disk can cause it to weaken and push outward. Part of the disk may then press on nearby nerves. There are two common types of herniated disks:  Contained means the soft nucleus is protruding outward.   Extruded means the firm annulus has torn, letting the soft center squeeze through.     Pressure from bone  An unstable spine   With age, a disk may thin and wear out. Vertebrae above and below the disk may begin to touch. This can put pressure on nerves. It can also cause bone spurs (growths) to form where the bones rub together.    Stenosis results when bone spurs narrow the foramen or spinal canal. This also puts pressure on nerves. Slipping vertebrae can irritate nerves and joints. They can also worsen stenosis.    In some cases, vertebrae become unstable and slip forward. This is called spondylolisthesis.     Date Last Reviewed: 10/12/2015    [id number] [employer]. [address] [place] [address], [place] [address]. All rights reserved. This information is not intended as a substitute for professional medical care. Always follow your healthcare professional's instructions.           Patient Education     Possible Causes of Low Back or Leg  Pain    The symptoms in your back or leg may be due to pressure on a nerve. This pressure may be caused by a damaged disk or by abnormal bone growth. Either way, you may feel pain, burning, tingling, or numbness. If you have pressure on a nerve that connects to the sciatic nerve, pain may shoot down your leg.    Pressure from the disk  Constant wear and tear can weaken a disk over time and cause back pain. The disk can then be damaged by a sudden movement or injury. If its soft center begins to bulge, the disk may press on a nerve. Or the outside of the disk may tear, and the soft center may squeeze through and pinch a nerve.    Pressure from bone  As a disk wears out, the vertebrae right above and below the disk begin to touch. This can put pressure on a nerve. Often, abnormal bone (called bone spurs) grows where the vertebrae rub against each other. This can cause the foramen or the spinal canal to narrow (called stenosis) and press against a nerve.  Date Last Reviewed: 10/4/2015    [id number] [employer]. [address] [place] [address]. All rights reserved. This information is not intended as a substitute for professional medical care. Always follow your healthcare professional's instructions.           Patient Education     Back Spasm (No Trauma)    Spasm of the back muscles can occur after a sudden forceful twisting or bending force (such as in a car accident), after a simple awkward movement, or after lifting something heavy with poor body positioning. In any case, muscle spasm adds to the pain. Sleeping in an awkward position or on a poor quality mattress can also cause this. Some people respond to emotional stress by tensing the muscles of their back.  Pain that continues may need further evaluation or other types of treatment such as physical therapy.  You don't always need X-rays for the initial evaluation of back pain, unless you had a physical injury such as from a car accident or  fall. If your pain continues and doesn't respond to medical treatment, X-rays and other tests may then be done.   Home care    As soon as possible, start sitting or walking again to avoid problems from prolonged bed rest (muscle weakness, worsening back stiffness and pain, blood clots in the legs).    When in bed, try to find a position of comfort. A firm mattress is best. Try lying flat on your back with pillows under your knees. You can also try lying on your side with your knees bent up toward your chest and a pillow between your knees.    Avoid prolonged sitting, long car rides, or travel. This puts more stress on the lower back than standing or walking.     During the first 24 to 72 hours after an injury or flare-up, apply an ice pack to the painful area for 20 minutes, then remove it for 20 minutes. Do this over a period of 60 to 90 minutes or several times a day. This will reduce swelling and pain. Always wrap ice packs in a thin towel.    You can start with ice, then switch to heat. Heat (hot shower, hot bath, or heating pad) reduces pain, and works well for muscle spasms. Apply heat to the painful area for 20 minutes, then remove it for 20 minutes. Do this over a period of 60 to 90 minutes or several times a day. Do not sleep on a heating pad as it can burn or damage skin.    Alternate ice and heat therapies.    Be aware of safe lifting methods and do not lift anything over 15 pounds until all the pain is gone.  Gentle stretching will help your back heal faster. Do this simple routine 2 to 3 times a day until your back is feeling better.    Lie on your back with your knees bent and both feet on the ground    Slowly raise your left knee to your chest as you flatten your lower back against the floor. Hold for 20 to 30 seconds.    Relax and repeat the exercise with your right knee.    Do 2 to 3 of these exercises for each leg.    Repeat, hugging both knees to your chest at the same time.    Do not bounce, but  use a gentle pull.  Medicines  Talk to your doctor before using medicine, especially if you have other medical problems or are taking other medicines.  You may use acetaminophen or ibuprofen to control pain, unless your healthcare provider prescribed another pain medicine. If you have a chronic condition such as diabetes, liver or kidney disease, stomach ulcer, or gastrointestinal bleeding, or are taking blood thinners, talk with your healthcare provider before taking any medicines.  Be careful if you are given prescription pain medicine, narcotics, or medicine for muscle spasm. They can cause drowsiness, affect your coordination, reflexes, or judgment. Do not drive or operate heavy machinery when taking these medicines. Take pain medicine only as prescribed by your healthcare provider.  Follow-up care  Follow up with your doctor, or as advised. Physical therapy or further tests may be needed.  If X-rays were taken, they may be reviewed by a radiologist. You will be notified of any new findings that may affect your care.  Call 911  Call 911 if any of these occur:    Trouble breathing    Confusion    Drowsiness or trouble awakening    Fainting or loss of consciousness    Rapid or very slow heart rate    Loss of bowel or bladder control  When to seek medical advice  Call your healthcare provider right away if any of these occur:    Pain becomes worse or spreads to your legs    Weakness or numbness in one or both legs    Numbness in the groin or genital area    Fever of 100.4 F (38 C) or higher, or as directed by your healthcare provider    Burning or pain when passing urine  Date Last Reviewed: 6/1/2016 2000-2017 [employer]. [address] [place] [address]. All rights reserved. This information is not intended as a substitute for professional medical care. Always follow your healthcare professional's instructions.

## 2021-06-17 NOTE — PROGRESS NOTES
Assessment and Plan:     1. Epigastric pain  Hemogram shows a white blood count of 4.  Differentials include gastritis,  cholelithiasis, pancreatitis, colitis, diveritulitis.  Will start omeprazole 20 mg daily.  Educated on its indications and side effects.  Will check pancreatic enzymes.  If elevated, may consider CT scan.  If fever develops or pain worsens, suggest ER evaluation.  - HM2(CBC w/o Differential)  - Comprehensive Metabolic Panel  - Lipase  - omeprazole (PRILOSEC) 20 MG capsule; Take 1 capsule (20 mg total) by mouth daily.  Dispense: 90 capsule; Refill: 0    2. Wheezing  Provide a prescription for Ventolin to be used as needed.  Educated on its indications and side effects including proper use.  - albuterol (PROAIR HFA;PROVENTIL HFA;VENTOLIN HFA) 90 mcg/actuation inhaler; Inhale 2 puffs every 4 (four) hours as needed.  Dispense: 1 Inhaler; Refill: 0    3. Cough  Suspect viral symptoms.  Discussed symptomatic treatment.  - XR Chest 2 Views; Future  - Influenza A/B Rapid Test    4.  Diarrhea  Patient has had chronic diarrhea.  He has had difficulty gaining weight.  I recommend gastroenterology referral for further evaluation of Crohn's, ulcerative colitis, celiac.  He declines this today.  He will follow-up if symptoms persist or worsen.      Subjective:     Jose is a 53 y.o. male presenting to the clinic for multiple concerns today.  Patient complains of cold symptoms for 3 days.  He has had a nonproductive cough, sinus congestion, postnasal drainage, headache, wheezing.  He denies nausea or vomiting.  He has been smoking one half pack per day.  He complains of hot flashes, chills, body aches.  Patient states Tuesday night, he developed midepigastric abdominal pain.  He has a history of both pancreatitis and gastritis due to alcohol abuse in the past.  Patient does not consume alcohol anymore.  He typically avoids ibuprofen, Aleve, aspirin and has not taken those recently.  Patient was diagnosed with  "celiac disease in childhood.  He had an endoscopy in his early 30s and was told he did not have celiac.  Patient states he cannot gain weight.  He experiences diarrhea 3-7 times daily.  He denies any blood or mucus in the stool.  He has not had a colonoscopy.    Review of Systems: A complete 14 point review of systems was obtained and is negative or as stated in the history of present illness.    Social History     Social History     Marital status:      Spouse name: N/A     Number of children: N/A     Years of education: N/A     Occupational History     Not on file.     Social History Main Topics     Smoking status: Current Every Day Smoker     Packs/day: 0.50     Years: 35.00     Smokeless tobacco: Never Used     Alcohol use No     Drug use: No     Sexual activity: Not on file     Other Topics Concern     Not on file     Social History Narrative       Active Ambulatory Problems     Diagnosis Date Noted     Arthritis      Resolved Ambulatory Problems     Diagnosis Date Noted     No Resolved Ambulatory Problems     Past Medical History:   Diagnosis Date     Gastritis      Kidney stones        Family History   Problem Relation Age of Onset     Cancer Mother      Multiple sclerosis Mother      Heart disease Father      Diabetes Father        Objective:     /62  Pulse 98  Temp 98.8  F (37.1  C)  Ht 5' 8\" (1.727 m)  Wt 143 lb 6.4 oz (65 kg)  SpO2 98%  BMI 21.8 kg/m2    Patient is alert, in no obvious distress.   Skin: Warm, dry.  No lesions or rashes.  Skin turgor rapid return.   HEENT:  Head normocephalic, atraumatic.  Eyes normal. Ears normal.  Nose patent, mucosa pink.  Oropharynx mucosa pink.  No lesions or tonsillar enlargement.   Neck: Supple, no lymphadenopathy.   Lungs:  Expiratory wheezing heard in bilateral lung bases.  Respirations even and unlabored.  No rhonchi or rales noted.   Heart:  Regular rate and rhythm.  No murmurs, S3, S4, gallops, or rubs.    Abdomen: Soft, tenderness to " palpation midepigastric.  No organomegaly. Bowel sounds normoactive. No guarding or masses noted. .      LABORATORY: I ordered and personally reviewed a chest x-ray showing no obvious infiltrate.  Will have radiology review.

## 2021-06-17 NOTE — PROGRESS NOTES
Assessment:     1. Asthma  predniSONE (DELTASONE) 10 mg tablet   2. Pancreatitis         Plan:     1. Asthma  Patient has a wheeze cough cycle going on now for a week his x-ray was reviewed no active infiltrate clinical exam shows expiratory wheezing patient has flare of pancreatitis which is getting better following medication may help both problems  - predniSONE (DELTASONE) 10 mg tablet; Take 1 tablet (10 mg total) by mouth daily. 4 tab daily x 2 days; 3 tab daily x 2 days; 2 tab daily x 2 days;one tab daily x 2 days  Dispense: 20 tablet; Refill: 0    2. Pancreatitis  Keep appointment with gastroenterology at [facility] within the next week or 2      Subjective:   I am seeing the patient he is complaining of wheezing and coughing for over a week now started on albuterol inhaler a week ago without relief today the patient's wheezing and coughing was go back to work tomorrow he is also had a flare of his pancreatitis but now that is getting better.  I have personally reviewed his family social history and most recent visit he denies any hemoptysis stools are loose no neurological complaints no headaches visual disturbances medical decision making was to treat the patient with prednisone we discussed the pros and cons of therapy patient agrees to short course of this medication he will keep his appointment with gastroenterology next week all medical questions that were asked were answered I did do a medication review with the patient today.  Follow-up with Dr. Munoz further problems.    Review of Systems: A complete 14 point review of systems was obtained and is negative or as stated in the history of present illness.    Past Medical History:   Diagnosis Date     Gastritis     alcoholism     Kidney stones      Family History   Problem Relation Age of Onset     Cancer Mother      Multiple sclerosis Mother      Heart disease Father      Diabetes Father      No past surgical history on file.  Social History    Substance Use Topics     Smoking status: Current Every Day Smoker     Packs/day: 0.50     Years: 35.00     Smokeless tobacco: Never Used     Alcohol use No         Objective:   /70  Pulse 72  Temp 98  F (36.7  C) (Oral)   Wt 137 lb 9.6 oz (62.4 kg)  BMI 20.92 kg/m2    General Appearance:  Alert, cooperative, no distress  Head:  Normocephalic, no obvious abnormality  Ears: TM anatomy normal  Eyes:  PERRL, EOM's intact, conjunctiva and corneas clear  Nose:  Nares symmetrical, septum midline, mucosa pink, no sinus tenderness  Throat:  Lips, tongue, and mucosa are moist, pink, and intact  Neck:  Supple, symmetrical, trachea midline, no adenopathy; thyroid: no enlargement, symmetric,no tenderness/mass/nodules; no carotid bruit, no JVD  Back:  Symmetrical, no curvature, ROM normal, no CVA tenderness  Chest/Breast:  No mass or tenderness  Lungs: Wheezes throughout chest but no rales or rhonchi heard  Heart:  Normal PMI, regular rate & rhythm, S1 and S2 normal, no murmurs, rubs, or gallops  Abdomen:  Soft, non-tender, bowel sounds active all four quadrants, no mass, or organomegaly  Musculoskeletal:  Tone and strength strong and symmetrical, all extremities  Lymphatic:  No adenopathy  Skin/Hair/Nails:  Skin warm, dry, and intact, no rashes  Neurologic:  Alert and oriented x3, no cranial nerve deficits, normal strength and tone, gait steady  Extremities:  No edema.  Fabian's sign negative.    Genitourinary: deferred  Pulses:  Equal bilaterally           This note has been dictated using voice recognition software. Any grammatical or context distortions are unintentional and inherent to the the software.

## 2021-06-18 NOTE — LETTER
Letter by Henrietta Holguin MD at      Author: Henrietta Holguin MD Service: -- Author Type: --    Filed:  Encounter Date: 1/18/2019 Status: (Other)       January 18, 2019     Patient: Jose Boyle   YOB: 1965   Date of Visit: 1/15/2019       To Whom It May Concern:    It is my medical opinion that Jose Boyle should be excused from work 1/16/2019 through 1/18/2019.    If you have any questions or concerns, please don't hesitate to call.    Sincerely,        Electronically signed by Henrietta Holguin MD

## 2021-06-20 NOTE — LETTER
Letter by Papi Ruffin DO at      Author: Papi Ruffin DO Service: -- Author Type: --    Filed:  Encounter Date: 9/9/2020 Status: (Other)         September 9, 2020     Patient: Jose Boyle   YOB: 1965   Date of Visit: 9/9/2020       To Whom it May Concern:    Jose Boyle was seen in my clinic on 9/9/2020. He may return to work on 9/10/2020.    If you have any questions or concerns, please don't hesitate to call.    Sincerely,         Electronically signed by Papi Ruffin DO

## 2021-06-23 NOTE — PROGRESS NOTES
"  Assessment/Plan:     Resents to clinic with known history of degenerative disc disease and herniation of the cervical spine but now lumbar spine acute pain times 4 days.  Here members no inciting incident.  There is no obvious trauma to the area.  He does have some decreased range of motion it is interfering with his life.  Recommended Flexeril up to 3 times daily as needed for pain, increased heat, and gentle stretching.  Patient has amenable to the plan and a handout was provided for symptomatic care.    Problem List Items Addressed This Visit     None      Visit Diagnoses     Back muscle spasm    -  Primary    Relevant Medications    cyclobenzaprine (FLEXERIL) 10 MG tablet          Return to clinic in 4 weeks for physical.     Total time spent with patient was 15 minutes with greater than 50% spent in face-to-face counseling regarding the above plan.    Subjective:      Jose Boyle is a 54 y.o. male who presents to clinic for back concerns.     Patient has had a history of a cervical disc fusion for herniated disc. Patient is now experiencing back pain in the midline by the beltline. If he stands he experiences pain that radiates down the right leg. Pain is exacerbated by bending, twisting, standing and sitting. Patient has a very physical job. Severity is described as a 4/10 now but a 10/10 when it strikes; it makes him stop \"in his tracks\". Pain is a stabbing, sharp pain. Pain is slightly improved by heat and biofreeze. Standing and walking can sometimes help provided he is not pushing or pulling something. Duration of symptoms 5 days, and it has slightly worsened.     Patient denies red warning symptoms of urinary retention, fecal incontinence or saddle anesthesia.       Past Medical History, Family History, and Social History reviewed.     Current Outpatient Medications on File Prior to Visit   Medication Sig Dispense Refill     albuterol (PROAIR HFA;PROVENTIL HFA;VENTOLIN HFA) 90 mcg/actuation inhaler " "Inhale 2 puffs every 4 (four) hours as needed. 1 Inhaler 0     gabapentin (NEURONTIN) 300 MG capsule TAKE ONE CAPSULE (300 MG TOTAL) BY MOUTH THREE TIMES DAILY 90 capsule 2     MULTIVITAMIN ORAL Take by mouth.       omeprazole (PRILOSEC) 20 MG capsule TAKE 1 CAPSULE BY MOUTH ONCE DAILY 90 capsule 0     dicyclomine (BENTYL) 10 MG capsule        methocarbamol (ROBAXIN) 500 MG tablet TAKE ONE TABLET (500 MG TOTAL) BY MOUTH THREE TIMES DAILY AS NEEDED 30 tablet 0     [DISCONTINUED] predniSONE (DELTASONE) 10 mg tablet Take 1 tablet (10 mg total) by mouth daily. 4 tab daily x 2 days; 3 tab daily x 2 days; 2 tab daily x 2 days;one tab daily x 2 days 20 tablet 0     No current facility-administered medications on file prior to visit.        Review of systems is as stated in HPI.  Endorses: joint pain, arhtrisis, back pain and joint swelling  The remainder of the 10 system review is otherwise negative.    Objective:     /78   Pulse 79   Ht 5' 8\" (1.727 m)   Wt 143 lb (64.9 kg)   SpO2 99%   BMI 21.74 kg/m   Body mass index is 21.74 kg/m .    Gen: Alert, NAD, appears stated age, normal hygiene   MSK: Patient struggled with forward folding but had preserved back bending twisting and side bending, no obvious deformity; nontender spinous processes, mild tenderness to palpation in the performance region bilaterally, some tenderness to palpation just lateral to the spinous processes, worse on the right than the left    This note has been dictated using voice recognition software. Any grammatical or context distortions are unintentional and inherent to the the software.     Henrietta Holguin MD      "

## 2021-06-23 NOTE — TELEPHONE ENCOUNTER
Refill Approved    Rx renewed per Medication Renewal Policy. Medication was last renewed on 5/1/18.    Ov: 1/15/19    Henrietta Davies, [facility] Triage/Med Refill 1/17/2019     Requested Prescriptions   Pending Prescriptions Disp Refills     gabapentin (NEURONTIN) 300 MG capsule [Pharmacy Med Name: GABAPENTIN 300MG    CAP] 90 capsule 2     Sig: TAKE 1 CAPSULE BY MOUTH THREE TIMES DAILY    Gabapentin/Levetiracetam/Tiagabine Refill Protocol  Passed - 1/16/2019  7:59 PM       Passed - PCP or prescribing provider visit in past 12 months or next 3 months    Last office visit with prescriber/PCP: 12/12/2016 Trish Munoz MD OR same dept: 1/15/2019 Henrietta Holguin MD OR same specialty: 1/15/2019 Henrietta Holguin MD  Last physical: Visit date not found Last MTM visit: Visit date not found   Next visit within 3 mo: Visit date not found  Next physical within 3 mo: Visit date not found  Prescriber OR PCP: Trish Munoz MD  Last diagnosis associated with med order: 1. Arthritis  - gabapentin (NEURONTIN) 300 MG capsule [Pharmacy Med Name: GABAPENTIN 300MG    CAP]; TAKE 1 CAPSULE BY MOUTH THREE TIMES DAILY  Dispense: 90 capsule; Refill: 2    If protocol passes may refill for 12 months if within 3 months of last provider visit (or a total of 15 months).

## 2021-06-24 NOTE — TELEPHONE ENCOUNTER
Refill Approved    Rx renewed per Medication Renewal Policy. Medication was last renewed on 11/8/18.    Tatum Rosenthal, [facility] Triage/Med Refill 2/14/2019     Requested Prescriptions   Pending Prescriptions Disp Refills     omeprazole (PRILOSEC) 20 MG capsule [Pharmacy Med Name: OMEPRAZOLE 20MG CAP] 90 capsule 0     Sig: TAKE 1 CAPSULE BY MOUTH ONCE DAILY    GI Medications Refill Protocol Passed - 2/13/2019 10:33 AM       Passed - PCP or prescribing provider visit in last 12 or next 3 months.    Last office visit with prescriber/PCP: 12/12/2016 Trsih Munoz MD OR same dept: 1/15/2019 Henrietta Holguin MD OR same specialty: 1/15/2019 Henrietta Holguin MD  Last physical: Visit date not found Last MTM visit: Visit date not found   Next visit within 3 mo: Visit date not found  Next physical within 3 mo: Visit date not found  Prescriber OR PCP: Trish Munoz MD  Last diagnosis associated with med order: 1. Epigastric pain  - omeprazole (PRILOSEC) 20 MG capsule [Pharmacy Med Name: OMEPRAZOLE 20MG CAP]; TAKE 1 CAPSULE BY MOUTH ONCE DAILY  Dispense: 90 capsule; Refill: 0    If protocol passes may refill for 12 months if within 3 months of last provider visit (or a total of 15 months).

## 2021-06-25 NOTE — TELEPHONE ENCOUNTER
RN cannot approve Refill Request    RN can NOT refill this medication PCP messaged that patient is overdue for Office Visit. Last office visit: Visit date not found Last Physical: Visit date not found Last MTM visit: Visit date not found Last visit same specialty: 5/18/2020 Sergio Merritt MD.  Next visit within 3 mo: Visit date not found  Next physical within 3 mo: Visit date not found      Larisa Gandhi, [facility] Triage/Med Refill 6/3/2021    Requested Prescriptions   Pending Prescriptions Disp Refills     omeprazole (PRILOSEC) 20 MG capsule [Pharmacy Med Name: Omeprazole 20 MG Oral Capsule Delayed Release] 90 capsule 0     Sig: Take 1 capsule by mouth once daily       GI Medications Refill Protocol Failed - 6/2/2021  7:46 PM        Failed - PCP or prescribing provider visit in last 12 or next 3 months.     Last office visit with prescriber/PCP: Visit date not found OR same dept: Visit date not found OR same specialty: 5/18/2020 Sergio Merritt MD  Last physical: Visit date not found Last MTM visit: Visit date not found   Next visit within 3 mo: Visit date not found  Next physical within 3 mo: Visit date not found  Prescriber OR PCP: Trish Munoz MD  Last diagnosis associated with med order: 1. Epigastric pain  - omeprazole (PRILOSEC) 20 MG capsule [Pharmacy Med Name: Omeprazole 20 MG Oral Capsule Delayed Release]; Take 1 capsule by mouth once daily  Dispense: 90 capsule; Refill: 0    If protocol passes may refill for 12 months if within 3 months of last provider visit (or a total of 15 months).

## 2021-06-27 ENCOUNTER — COMMUNICATION - HEALTHEAST (OUTPATIENT)
Dept: PHYSICAL MEDICINE AND REHAB | Facility: CLINIC | Age: 56
End: 2021-06-27

## 2021-06-27 DIAGNOSIS — M54.12 CERVICAL RADICULAR PAIN: ICD-10-CM

## 2021-06-27 DIAGNOSIS — M67.912 TENDINOPATHY OF LEFT ROTATOR CUFF: ICD-10-CM

## 2021-06-27 DIAGNOSIS — M79.18 MYOFASCIAL MUSCLE PAIN: ICD-10-CM

## 2021-06-28 RX ORDER — CYCLOBENZAPRINE HCL 10 MG
TABLET ORAL
Qty: 30 TABLET | Refills: 0 | Status: SHIPPED | OUTPATIENT
Start: 2021-06-28 | End: 2021-09-08

## 2021-07-22 DIAGNOSIS — M79.18 MYOFASCIAL PAIN: Primary | ICD-10-CM

## 2021-07-23 RX ORDER — CYCLOBENZAPRINE HCL 10 MG
TABLET ORAL
Qty: 30 TABLET | Refills: 0 | Status: SHIPPED | OUTPATIENT
Start: 2021-07-23 | End: 2021-08-23

## 2021-08-20 DIAGNOSIS — M79.18 MYOFASCIAL PAIN: ICD-10-CM

## 2021-08-23 RX ORDER — CYCLOBENZAPRINE HCL 10 MG
TABLET ORAL
Qty: 30 TABLET | Refills: 0 | Status: SHIPPED | OUTPATIENT
Start: 2021-08-23 | End: 2021-09-08

## 2021-08-31 DIAGNOSIS — R10.13 EPIGASTRIC PAIN: ICD-10-CM

## 2021-09-01 NOTE — TELEPHONE ENCOUNTER
"Routing refill request to provider for review/approval because:  Patient needs to be seen because it has been more than 1 year since last office visit.    Last Written Prescription Date:  6/7/21  Last Fill Quantity: 90,  # refills: 0   Last office visit provider:  5/18/20     Requested Prescriptions   Pending Prescriptions Disp Refills     omeprazole (PRILOSEC) 20 MG DR capsule [Pharmacy Med Name: Omeprazole 20 MG Oral Capsule Delayed Release] 90 capsule 0     Sig: Take 1 capsule by mouth once daily       PPI Protocol Failed - 8/31/2021  6:05 PM        Failed - Recent (12 mo) or future (30 days) visit within the authorizing provider's specialty     Patient has had an office visit with the authorizing provider or a provider within the authorizing providers department within the previous 12 mos or has a future within next 30 days. See \"Patient Info\" tab in inbasket, or \"Choose Columns\" in Meds & Orders section of the refill encounter.              Passed - Not on Clopidogrel (unless Pantoprazole ordered)        Passed - No diagnosis of osteoporosis on record        Passed - Medication is active on med list        Passed - Patient is age 18 or older             Randall Doyle RN 09/01/21 1:27 PM  "

## 2021-09-04 ENCOUNTER — HEALTH MAINTENANCE LETTER (OUTPATIENT)
Age: 56
End: 2021-09-04

## 2021-09-08 ENCOUNTER — APPOINTMENT (OUTPATIENT)
Dept: GENERAL RADIOLOGY | Facility: CLINIC | Age: 56
End: 2021-09-08
Payer: COMMERCIAL

## 2021-09-08 ENCOUNTER — VIRTUAL VISIT (OUTPATIENT)
Dept: FAMILY MEDICINE | Facility: CLINIC | Age: 56
End: 2021-09-08
Payer: COMMERCIAL

## 2021-09-08 ENCOUNTER — MYC MEDICAL ADVICE (OUTPATIENT)
Dept: FAMILY MEDICINE | Facility: CLINIC | Age: 56
End: 2021-09-08

## 2021-09-08 VITALS — OXYGEN SATURATION: 95 % | HEART RATE: 50 BPM | TEMPERATURE: 98 F

## 2021-09-08 DIAGNOSIS — J40 BRONCHITIS: Primary | ICD-10-CM

## 2021-09-08 DIAGNOSIS — R05.8 PRODUCTIVE COUGH: Primary | ICD-10-CM

## 2021-09-08 PROCEDURE — 99213 OFFICE O/P EST LOW 20 MIN: CPT | Mod: TEL | Performed by: NURSE PRACTITIONER

## 2021-09-08 RX ORDER — CYCLOBENZAPRINE HCL 10 MG
10 TABLET ORAL 2 TIMES DAILY PRN
COMMUNITY
Start: 2021-06-28 | End: 2021-10-13

## 2021-09-08 NOTE — PROGRESS NOTES
Jose is a 56 year old who is being evaluated via a billable telephone visit.      What phone number would you like to be contacted at? [phone]  How would you like to obtain your AVS? MyChart    Assessment & Plan     Productive cough  Symptoms most consistent with bronchitis versus pneumonia. COVID test was negative. Patient is willing to come in for a chest xray later today. Will await radiology's interpretation and follow up with the patient when when results are available. Will determine if antibiotic treatment is indicated at that time. He will continue with symptomatic cares in the meantime.   - XR Chest 2 Views     No follow-ups on file.    ARAM Jasmine CNP  [facility]   Jose is a 56 year old who presents for the following health issues     HPI   Patient is here today with concerns of productive cough. He developed symptoms on Friday while at work. Symptoms worsened over the course of the day. He has developed a lot of phlegm and chest congestion. He has some shortness of breath. He also had bosy ches, chills and fever of 101.9 over hte weekend. The fever has since subsided. He monitored his oxygen at home which has been between 89 and 95. Currently it is 95. He had a COVID test and results came back negative yesterday. He has had issues with bronchitis in the past but not as many issues recently. His fiance has come down with similar symptoms.     Review of Systems   Review of Systems - pertinent positives noted in HPI, otherwise ROS is negative.        Objective           Vitals:  No vitals were obtained today due to virtual visit.    Physical Exam   healthy, alert and no distress  PSYCH: Alert and oriented times 3; coherent speech, normal   rate and volume, able to articulate logical thoughts, able   to abstract reason, no tangential thoughts, no hallucinations   or delusions  His affect is normal  RESP: Cough present, no audible wheezing, able to talk in  full sentences  Remainder of exam unable to be completed due to telephone visits      Chest xray: pending    Phone call duration: 9 minutes

## 2021-09-10 RX ORDER — AZITHROMYCIN 250 MG/1
TABLET, FILM COATED ORAL
Qty: 6 TABLET | Refills: 0 | Status: SHIPPED | OUTPATIENT
Start: 2021-09-10 | End: 2021-09-15

## 2021-09-19 PROBLEM — R10.9 ABDOMINAL PAIN, UNSPECIFIED ABDOMINAL LOCATION: Status: RESOLVED | Noted: 2018-05-24 | Resolved: 2021-09-19

## 2021-09-19 PROBLEM — R05.9 COUGH: Status: RESOLVED | Noted: 2020-05-21 | Resolved: 2021-09-19

## 2021-09-19 PROBLEM — R19.7 DIARRHEA: Status: RESOLVED | Noted: 2018-05-24 | Resolved: 2021-09-19

## 2021-10-13 ENCOUNTER — OFFICE VISIT (OUTPATIENT)
Dept: FAMILY MEDICINE | Facility: CLINIC | Age: 56
End: 2021-10-13
Payer: COMMERCIAL

## 2021-10-13 VITALS
TEMPERATURE: 98.5 F | RESPIRATION RATE: 20 BRPM | HEART RATE: 97 BPM | OXYGEN SATURATION: 99 % | HEIGHT: 68 IN | WEIGHT: 139.8 LBS | BODY MASS INDEX: 21.19 KG/M2

## 2021-10-13 DIAGNOSIS — R05.9 COUGH: Primary | ICD-10-CM

## 2021-10-13 DIAGNOSIS — Z72.0 TOBACCO USER: ICD-10-CM

## 2021-10-13 DIAGNOSIS — K52.9 CHRONIC DIARRHEA: ICD-10-CM

## 2021-10-13 PROCEDURE — 99214 OFFICE O/P EST MOD 30 MIN: CPT | Performed by: FAMILY MEDICINE

## 2021-10-13 RX ORDER — ALBUTEROL SULFATE 90 UG/1
1-2 AEROSOL, METERED RESPIRATORY (INHALATION) EVERY 4 HOURS PRN
Qty: 8 G | Refills: 1 | Status: SHIPPED | OUTPATIENT
Start: 2021-10-13 | End: 2022-05-25

## 2021-10-13 ASSESSMENT — MIFFLIN-ST. JEOR: SCORE: 1433.63

## 2021-10-21 ENCOUNTER — HOSPITAL ENCOUNTER (EMERGENCY)
Facility: CLINIC | Age: 56
Discharge: HOME | End: 2021-10-21
Attending: STUDENT IN AN ORGANIZED HEALTH CARE EDUCATION/TRAINING PROGRAM | Admitting: STUDENT IN AN ORGANIZED HEALTH CARE EDUCATION/TRAINING PROGRAM
Payer: COMMERCIAL

## 2021-10-21 ENCOUNTER — APPOINTMENT (OUTPATIENT)
Dept: CT IMAGING | Facility: CLINIC | Age: 56
End: 2021-10-21
Attending: STUDENT IN AN ORGANIZED HEALTH CARE EDUCATION/TRAINING PROGRAM
Payer: COMMERCIAL

## 2021-10-21 VITALS
WEIGHT: 132 LBS | DIASTOLIC BLOOD PRESSURE: 68 MMHG | TEMPERATURE: 98.2 F | BODY MASS INDEX: 20.07 KG/M2 | HEART RATE: 72 BPM | SYSTOLIC BLOOD PRESSURE: 112 MMHG | RESPIRATION RATE: 16 BRPM | OXYGEN SATURATION: 98 %

## 2021-10-21 DIAGNOSIS — K40.30 NON-RECURRENT UNILATERAL INGUINAL HERNIA WITH OBSTRUCTION WITHOUT GANGRENE: ICD-10-CM

## 2021-10-21 LAB
ALBUMIN SERPL-MCNC: 4.4 G/DL (ref 3.5–5)
ALP SERPL-CCNC: 76 U/L (ref 45–120)
ALT SERPL W P-5'-P-CCNC: 15 U/L (ref 0–45)
ANION GAP SERPL CALCULATED.3IONS-SCNC: 14 MMOL/L (ref 5–18)
AST SERPL W P-5'-P-CCNC: 20 U/L (ref 0–40)
BASOPHILS # BLD AUTO: 0 10E3/UL (ref 0–0.2)
BASOPHILS NFR BLD AUTO: 0 %
BILIRUB DIRECT SERPL-MCNC: 0.3 MG/DL
BILIRUB SERPL-MCNC: 1.2 MG/DL (ref 0–1)
BUN SERPL-MCNC: 11 MG/DL (ref 8–22)
CALCIUM SERPL-MCNC: 10 MG/DL (ref 8.5–10.5)
CHLORIDE BLD-SCNC: 105 MMOL/L (ref 98–107)
CO2 SERPL-SCNC: 21 MMOL/L (ref 22–31)
CREAT SERPL-MCNC: 1.12 MG/DL (ref 0.7–1.3)
EOSINOPHIL # BLD AUTO: 0 10E3/UL (ref 0–0.7)
EOSINOPHIL NFR BLD AUTO: 0 %
ERYTHROCYTE [DISTWIDTH] IN BLOOD BY AUTOMATED COUNT: 14.4 % (ref 10–15)
GFR SERPL CREATININE-BSD FRML MDRD: 73 ML/MIN/1.73M2
GLUCOSE BLD-MCNC: 112 MG/DL (ref 70–125)
HCT VFR BLD AUTO: 41 % (ref 40–53)
HGB BLD-MCNC: 14 G/DL (ref 13.3–17.7)
IMM GRANULOCYTES # BLD: 0 10E3/UL
IMM GRANULOCYTES NFR BLD: 0 %
LACTATE SERPL-SCNC: 0.8 MMOL/L (ref 0.7–2)
LIPASE SERPL-CCNC: 33 U/L (ref 0–52)
LYMPHOCYTES # BLD AUTO: 1.5 10E3/UL (ref 0.8–5.3)
LYMPHOCYTES NFR BLD AUTO: 16 %
MCH RBC QN AUTO: 31.1 PG (ref 26.5–33)
MCHC RBC AUTO-ENTMCNC: 34.1 G/DL (ref 31.5–36.5)
MCV RBC AUTO: 91 FL (ref 78–100)
MONOCYTES # BLD AUTO: 0.4 10E3/UL (ref 0–1.3)
MONOCYTES NFR BLD AUTO: 5 %
NEUTROPHILS # BLD AUTO: 7.3 10E3/UL (ref 1.6–8.3)
NEUTROPHILS NFR BLD AUTO: 79 %
NRBC # BLD AUTO: 0 10E3/UL
NRBC BLD AUTO-RTO: 0 /100
PLATELET # BLD AUTO: 212 10E3/UL (ref 150–450)
POTASSIUM BLD-SCNC: 4.3 MMOL/L (ref 3.5–5)
PROT SERPL-MCNC: 7.3 G/DL (ref 6–8)
RBC # BLD AUTO: 4.5 10E6/UL (ref 4.4–5.9)
SODIUM SERPL-SCNC: 140 MMOL/L (ref 136–145)
WBC # BLD AUTO: 9.3 10E3/UL (ref 4–11)

## 2021-10-21 PROCEDURE — 83690 ASSAY OF LIPASE: CPT | Performed by: PHYSICIAN ASSISTANT

## 2021-10-21 PROCEDURE — 82248 BILIRUBIN DIRECT: CPT | Performed by: PHYSICIAN ASSISTANT

## 2021-10-21 PROCEDURE — 250N000011 HC RX IP 250 OP 636: Performed by: STUDENT IN AN ORGANIZED HEALTH CARE EDUCATION/TRAINING PROGRAM

## 2021-10-21 PROCEDURE — 80053 COMPREHEN METABOLIC PANEL: CPT | Performed by: PHYSICIAN ASSISTANT

## 2021-10-21 PROCEDURE — 36415 COLL VENOUS BLD VENIPUNCTURE: CPT | Performed by: STUDENT IN AN ORGANIZED HEALTH CARE EDUCATION/TRAINING PROGRAM

## 2021-10-21 PROCEDURE — 36415 COLL VENOUS BLD VENIPUNCTURE: CPT | Performed by: PHYSICIAN ASSISTANT

## 2021-10-21 PROCEDURE — 99285 EMERGENCY DEPT VISIT HI MDM: CPT | Mod: 25

## 2021-10-21 PROCEDURE — 85025 COMPLETE CBC W/AUTO DIFF WBC: CPT | Performed by: PHYSICIAN ASSISTANT

## 2021-10-21 PROCEDURE — 74177 CT ABD & PELVIS W/CONTRAST: CPT

## 2021-10-21 PROCEDURE — 83605 ASSAY OF LACTIC ACID: CPT | Performed by: STUDENT IN AN ORGANIZED HEALTH CARE EDUCATION/TRAINING PROGRAM

## 2021-10-21 PROCEDURE — 96374 THER/PROPH/DIAG INJ IV PUSH: CPT | Mod: 59

## 2021-10-21 PROCEDURE — 250N000011 HC RX IP 250 OP 636: Performed by: PHYSICIAN ASSISTANT

## 2021-10-21 PROCEDURE — 96375 TX/PRO/DX INJ NEW DRUG ADDON: CPT

## 2021-10-21 RX ORDER — MORPHINE SULFATE 4 MG/ML
4 INJECTION, SOLUTION INTRAMUSCULAR; INTRAVENOUS ONCE
Status: COMPLETED | OUTPATIENT
Start: 2021-10-21 | End: 2021-10-21

## 2021-10-21 RX ORDER — IOPAMIDOL 755 MG/ML
100 INJECTION, SOLUTION INTRAVASCULAR ONCE
Status: COMPLETED | OUTPATIENT
Start: 2021-10-21 | End: 2021-10-21

## 2021-10-21 RX ORDER — KETOROLAC TROMETHAMINE 30 MG/ML
30 INJECTION, SOLUTION INTRAMUSCULAR; INTRAVENOUS ONCE
Status: COMPLETED | OUTPATIENT
Start: 2021-10-21 | End: 2021-10-21

## 2021-10-21 RX ADMIN — MORPHINE SULFATE 4 MG: 4 INJECTION, SOLUTION INTRAMUSCULAR; INTRAVENOUS at 13:24

## 2021-10-21 RX ADMIN — IOPAMIDOL 100 ML: 755 INJECTION, SOLUTION INTRAVENOUS at 11:17

## 2021-10-21 RX ADMIN — KETOROLAC TROMETHAMINE 30 MG: 30 INJECTION, SOLUTION INTRAMUSCULAR at 13:24

## 2021-10-21 ASSESSMENT — ENCOUNTER SYMPTOMS
SHORTNESS OF BREATH: 0
HEMATURIA: 0
DYSURIA: 0
SORE THROAT: 0
FEVER: 0
CHILLS: 0
VOMITING: 0
BLOOD IN STOOL: 0
HEADACHES: 0
ABDOMINAL PAIN: 1
DIARRHEA: 0
DIZZINESS: 0
COUGH: 0
NAUSEA: 0

## 2021-10-21 NOTE — ED PROVIDER NOTES
EMERGENCY DEPARTMENT ENCOUNTER      NAME: Jose Boyle  AGE: 56 year old male  YOB: 1965  MRN: [id number]  EVALUATION DATE & TIME: 10/21/2021 12:16 PM    PCP: Trish Munoz    ED PROVIDER: Shaquille Baxter M.D.      Chief Complaint   Patient presents with     Abdominal Pain         FINAL IMPRESSION:  1. Non-recurrent unilateral inguinal hernia with obstruction without gangrene        ED COURSE & MEDICAL DECISION MAKIN:35 PM I met with the patient, obtained history, performed an initial exam, and discussed options and plan for diagnostics and treatment here in the ED.  12:38 PM I performed hernia reduction procedures, without any complications. Will plan to page surgeon  12:59 PM spoke to Dr. Browne the surgeon who reviewed the CAT scan.  Possibility obstruction or strangulation was fairly equivocal it sounds like on the CT scan.  After discussion of the patient's symptoms we decided that we would observe him here in the emergency department for an hour to and if the pain is resolved and there is no recurrence of the hernia we could consider discharging home with follow-up with them in the next few days in the office.  3:09 PM I reevaluated and updated patient on lab work  and imaging findings. Discussed plans for discharge. Patient was comfortable with plan.    Pertinent Labs & Imaging studies reviewed. (See chart for details)  56 year old male presents to the Emergency Department for evaluation of inguinal pain.  Patient had a inguinal hernia on exam and CT scan showed some enhancement of some bowel which was present in the hernia.  Based on the history sounded as if this had only been present for a few hours.  Scan was reviewed by the surgeon as well as the radiologist and they felt that the likelihood of strangulation or significant bowel injury was relatively low.  I was able to easily reduce the hernia in the emergency department without pain medicine.  Patient's pain and discomfort  improved significantly and he was able to tolerate p.o. and ambulate without difficulty and was observed in the emergency department for approximately 2 hours post reduction.  Patient was comfortable with discharge and follow-up with the surgeon and will return should things change or worsen.    At the conclusion of the encounter I discussed the results of all of the tests and the disposition. The questions were answered. The patient or family acknowledged understanding and was agreeable with the care plan.           MEDICATIONS GIVEN IN THE EMERGENCY:  Medications   iopamidol (ISOVUE-370) solution 100 mL (100 mLs Intravenous Given 10/21/21 1117)   morphine (PF) injection 4 mg (4 mg Intravenous Given 10/21/21 1324)   ketorolac (TORADOL) injection 30 mg (30 mg Intravenous Given 10/21/21 1324)       NEW PRESCRIPTIONS STARTED AT TODAY'S ER VISIT  Discharge Medication List as of 10/21/2021  4:08 PM             =================================================================    HPI    Patient information was obtained from: Patient and wife    Use of : N/A       Jose Boyle is a 56 year old male with a pertinent history of IBS, pancreatitis, gastritis, kidney stones, hiatal hernia, smoking who presents to this ED via walk in for evaluation of abdominal pain.     This morning around 0600, patient was going to work when he had a sudden onset of bloating and cramping pain around his rib cage area. Patient attributes this pain with his past history of pancreatitis flare ups, sometimes associated eating, drinking or taking tylenol medication. However, patient also noticed he had a painful bulge to the left side of groin. He has never had hernias to this area before. This bulge, once it grew outwards, never when back in. Denies any fevers, nausea, vomiting, or any changes in bladder and bowel habits.     REVIEW OF SYSTEMS   Review of Systems   Constitutional: Negative for chills and fever.   HENT: Negative for  congestion and sore throat.    Respiratory: Negative for cough and shortness of breath.    Cardiovascular: Negative for chest pain.   Gastrointestinal: Positive for abdominal pain (cramping, bloating to rib cage area). Negative for blood in stool, diarrhea, nausea and vomiting.   Genitourinary: Negative for dysuria and hematuria.        Positive for painful bulge to left side of groin.   Skin: Negative for rash.   Neurological: Negative for dizziness and headaches.   All other systems reviewed and are negative.       PAST MEDICAL HISTORY:  Past Medical History:   Diagnosis Date     Gastritis     alcoholism     Hiatal hernia      Irritable bowel syndrome      Kidney stones        PAST SURGICAL HISTORY:  Past Surgical History:   Procedure Laterality Date     ANTERIOR CERVICAL DISCECTOMY W/ FUSION  2005    C4-5, C5-6     CLOSED REDUCTION, PERCUTANEOUS PINNING  HAND, COMBINED       HERNIORRHAPHY INGUINAL Left 10/22/2021    Procedure: HERNIORRHAPHY, INGUINAL, OPEN, LEFT;  Surgeon: Carola Browne DO;  Location: [facility] Main OR     IR CERVICAL EPIDURAL STEROID INJECTION  6/27/2005           CURRENT MEDICATIONS:    acetaminophen (TYLENOL) 325 MG tablet  albuterol (PROAIR HFA/PROVENTIL HFA/VENTOLIN HFA) 108 (90 Base) MCG/ACT inhaler  dicyclomine (BENTYL) 10 MG capsule  MULTIVITAMIN ORAL  omeprazole (PRILOSEC) 20 MG DR capsule  oxyCODONE (ROXICODONE) 5 MG tablet        ALLERGIES:  Allergies   Allergen Reactions     Ibuprofen Nausea       FAMILY HISTORY:  Family History   Problem Relation Age of Onset     Cancer Mother      Multiple Sclerosis Mother      Heart Disease Father      Diabetes Father        SOCIAL HISTORY:   Social History     Socioeconomic History     Marital status:      Spouse name: Not on file     Number of children: Not on file     Years of education: Not on file     Highest education level: Not on file   Occupational History     Not on file   Tobacco Use     Smoking status: Current Every Day Smoker      Packs/day: 1.00     Years: 35.00     Pack years: 35.00     Types: Cigarettes     Smokeless tobacco: Never Used   Substance and Sexual Activity     Alcohol use: Not on file     Drug use: Not on file     Sexual activity: Not on file   Other Topics Concern     Not on file   Social History Narrative     Not on file     Social Determinants of Health     Financial Resource Strain:      Difficulty of Paying Living Expenses:    Food Insecurity:      Worried About Running Out of Food in the Last Year:      Ran Out of Food in the Last Year:    Transportation Needs:      Lack of Transportation (Medical):      Lack of Transportation (Non-Medical):    Physical Activity:      Days of Exercise per Week:      Minutes of Exercise per Session:    Stress:      Feeling of Stress :    Social Connections:      Frequency of Communication with Friends and Family:      Frequency of Social Gatherings with Friends and Family:      Attends Holiness Services:      Active Member of Clubs or Organizations:      Attends Club or Organization Meetings:      Marital Status:    Intimate Partner Violence:      Fear of Current or Ex-Partner:      Emotionally Abused:      Physically Abused:      Sexually Abused:        VITALS:  /68   Pulse 72   Temp 98.2  F (36.8  C) (Oral)   Resp 16   Wt 59.9 kg (132 lb)   SpO2 98%   BMI 20.07 kg/m        PHYSICAL EXAM    Constitutional: Well developed, Well nourished, NAD, GCS 15  HENT: Normocephalic, Atraumatic, Bilateral external ears normal, Oropharynx normal, mucous membranes moist, Nose normal. Neck-  Normal range of motion, No tenderness, Supple, No stridor.  Eyes: PERRL, EOMI, Conjunctiva normal, No discharge.   Respiratory: Normal breath sounds, No respiratory distress, No wheezing, Speaks full sentences easily. No cough.  Cardiovascular: Normal heart rate, Regular rhythm, No murmurs, No rubs, No gallops. Chest wall nontender.  GI: Soft, No tenderness, No masses, No flank tenderness. No rebound  or guarding.  There is a palpable mass in the inguinal region anteriorly.  No pain in the testicles.  Musculoskeletal: 2+ DP pulses. No edema.No cyanosis, No clubbing. Good range of motion in all major joints. No tenderness to palpation or major deformities noted.   Integument: Warm, Dry, No erythema, No rash. No petechiae.   Neurologic: Alert & oriented x 3,  CN 3-12 intact Normal motor function, Normal sensory function, No focal deficits noted. Normal gait. Normal finger to nose bilaterally  Psychiatric: Affect normal, Judgment normal, Mood normal. Cooperative.       LAB:  All pertinent labs reviewed and interpreted.  Labs Ordered and Resulted from Time of ED Arrival Up to the Time of Departure from the ED   BASIC METABOLIC PANEL - Abnormal; Notable for the following components:       Result Value    Carbon Dioxide (CO2) 21 (*)     All other components within normal limits   HEPATIC FUNCTION PANEL - Abnormal; Notable for the following components:    Bilirubin Total 1.2 (*)     All other components within normal limits   LIPASE - Normal   LACTIC ACID WHOLE BLOOD - Normal   CBC WITH PLATELETS AND DIFFERENTIAL   CBC WITH PLATELETS & DIFFERENTIAL    Narrative:     The following orders were created for panel order CBC with platelets differential.  Procedure                               Abnormality         Status                     ---------                               -----------         ------                     CBC with platelets and d...[[id number]]                      Final result                 Please view results for these tests on the individual orders.       RADIOLOGY:  Reviewed all pertinent imaging. Please see official radiology report.  CT Abdomen Pelvis w Contrast   Final Result   IMPRESSION:       1.  Left inguinal hernia containing abnormal small bowel. Small bowel wall is hyperenhancing and possibly twisted with focal fluid-filled dilatation within this hernia Developing small bowel incarceration /  strangulation within the differential.      2.  Proximal to this small bowel containing left inguinal hernia, mild asymmetric increased caliber of small bowel with small bowel feces, consistent with slow transit or developing obstruction.      3.  No bowel wall pneumatosis. No free air or abscess.      4.  Other findings in the report.         Findings verbally communicated to Dr. Baxter [facility] Emergency Room at 11:48 AM on 10/21/2021.      NOTE: ABNORMAL REPORT      THE DICTATION ABOVE DESCRIBES AN ABNORMALITY FOR WHICH FOLLOW-UP IS NEEDED.           EKG:    None.     PROCEDURES:   None.     I, Sara Behmanesh, am serving as a scribe to document services personally performed by Dr. Shaquille Baxter based on my observation and the provider's statements to me. IShaquille MD attest that Sara Behmanesh is acting in a scribe capacity, has observed my performance of the services and has documented them in accordance with my direction.    Shaquille Baxter M.D.  Emergency Medicine  [facility] EMERGENCY ROOM  [address] [id number] [phone]  Dept: [phone]       Shaquille Baxter MD  10/26/21 0032

## 2021-10-21 NOTE — Clinical Note
Jose Tamar was seen and treated in our emergency department on 10/21/2021.  He may return to work on 10/28/2021.       If you have any questions or concerns, please don't hesitate to call.      Shaquille Baxter MD

## 2021-10-21 NOTE — DISCHARGE INSTRUCTIONS
Please avoid any heavy lifting until follow-up.  You should see Dr. Browne the surgeon in her office.  Please call [phone] to schedule an appointment.

## 2021-10-21 NOTE — ED TRIAGE NOTES
Pt here with left sided abd. Pain that started this am. Pt has history of IBS and pancreatitis. Pt also has lump in left groin area that he just noticed today. Last BM this am, diarrhea.

## 2021-10-22 ENCOUNTER — APPOINTMENT (OUTPATIENT)
Dept: RADIOLOGY | Facility: CLINIC | Age: 56
DRG: 352 | End: 2021-10-22
Attending: FAMILY MEDICINE
Payer: COMMERCIAL

## 2021-10-22 ENCOUNTER — ANCILLARY PROCEDURE (OUTPATIENT)
Dept: ULTRASOUND IMAGING | Facility: CLINIC | Age: 56
DRG: 352 | End: 2021-10-22
Attending: ANESTHESIOLOGY
Payer: COMMERCIAL

## 2021-10-22 ENCOUNTER — ANESTHESIA EVENT (OUTPATIENT)
Dept: SURGERY | Facility: CLINIC | Age: 56
DRG: 352 | End: 2021-10-22
Payer: COMMERCIAL

## 2021-10-22 ENCOUNTER — APPOINTMENT (OUTPATIENT)
Dept: CT IMAGING | Facility: CLINIC | Age: 56
DRG: 352 | End: 2021-10-22
Attending: FAMILY MEDICINE
Payer: COMMERCIAL

## 2021-10-22 ENCOUNTER — ANESTHESIA (OUTPATIENT)
Dept: SURGERY | Facility: CLINIC | Age: 56
DRG: 352 | End: 2021-10-22
Payer: COMMERCIAL

## 2021-10-22 ENCOUNTER — HOSPITAL ENCOUNTER (INPATIENT)
Facility: CLINIC | Age: 56
LOS: 1 days | Discharge: HOME | DRG: 352 | End: 2021-10-23
Attending: FAMILY MEDICINE | Admitting: INTERNAL MEDICINE
Payer: COMMERCIAL

## 2021-10-22 DIAGNOSIS — K40.30 INGUINAL HERNIA OF LEFT SIDE WITH OBSTRUCTION: ICD-10-CM

## 2021-10-22 LAB
ALBUMIN SERPL-MCNC: 4.3 G/DL (ref 3.5–5)
ALP SERPL-CCNC: 82 U/L (ref 45–120)
ALT SERPL W P-5'-P-CCNC: 14 U/L (ref 0–45)
AST SERPL W P-5'-P-CCNC: 21 U/L (ref 0–40)
ATRIAL RATE - MUSE: 72 BPM
BASOPHILS # BLD AUTO: 0 10E3/UL (ref 0–0.2)
BASOPHILS NFR BLD AUTO: 0 %
BILIRUB DIRECT SERPL-MCNC: 0.5 MG/DL
BILIRUB SERPL-MCNC: 1.8 MG/DL (ref 0–1)
DIASTOLIC BLOOD PRESSURE - MUSE: 80 MMHG
EOSINOPHIL # BLD AUTO: 0 10E3/UL (ref 0–0.7)
EOSINOPHIL NFR BLD AUTO: 0 %
ERYTHROCYTE [DISTWIDTH] IN BLOOD BY AUTOMATED COUNT: 14.1 % (ref 10–15)
HCT VFR BLD AUTO: 39 % (ref 40–53)
HGB BLD-MCNC: 13.4 G/DL (ref 13.3–17.7)
IMM GRANULOCYTES # BLD: 0 10E3/UL
IMM GRANULOCYTES NFR BLD: 0 %
INTERPRETATION ECG - MUSE: NORMAL
LACTATE SERPL-SCNC: 0.9 MMOL/L (ref 0.7–2)
LIPASE SERPL-CCNC: 31 U/L (ref 0–52)
LYMPHOCYTES # BLD AUTO: 1.1 10E3/UL (ref 0.8–5.3)
LYMPHOCYTES NFR BLD AUTO: 12 %
MAGNESIUM SERPL-MCNC: 1.9 MG/DL (ref 1.8–2.6)
MCH RBC QN AUTO: 31.2 PG (ref 26.5–33)
MCHC RBC AUTO-ENTMCNC: 34.4 G/DL (ref 31.5–36.5)
MCV RBC AUTO: 91 FL (ref 78–100)
MONOCYTES # BLD AUTO: 0.3 10E3/UL (ref 0–1.3)
MONOCYTES NFR BLD AUTO: 3 %
NEUTROPHILS # BLD AUTO: 7.9 10E3/UL (ref 1.6–8.3)
NEUTROPHILS NFR BLD AUTO: 85 %
NRBC # BLD AUTO: 0 10E3/UL
NRBC BLD AUTO-RTO: 0 /100
P AXIS - MUSE: 79 DEGREES
PLATELET # BLD AUTO: 193 10E3/UL (ref 150–450)
POTASSIUM BLD-SCNC: 4.3 MMOL/L (ref 3.5–5)
PR INTERVAL - MUSE: 142 MS
PROCALCITONIN SERPL-MCNC: 0.06 NG/ML (ref 0–0.49)
PROT SERPL-MCNC: 7.5 G/DL (ref 6–8)
QRS DURATION - MUSE: 82 MS
QT - MUSE: 394 MS
QTC - MUSE: 431 MS
R AXIS - MUSE: 60 DEGREES
RBC # BLD AUTO: 4.3 10E6/UL (ref 4.4–5.9)
SARS-COV-2 RNA RESP QL NAA+PROBE: NEGATIVE
SYSTOLIC BLOOD PRESSURE - MUSE: 137 MMHG
T AXIS - MUSE: 65 DEGREES
VENTRICULAR RATE- MUSE: 72 BPM
WBC # BLD AUTO: 9.3 10E3/UL (ref 4–11)

## 2021-10-22 PROCEDURE — 96374 THER/PROPH/DIAG INJ IV PUSH: CPT

## 2021-10-22 PROCEDURE — 71045 X-RAY EXAM CHEST 1 VIEW: CPT

## 2021-10-22 PROCEDURE — 250N000011 HC RX IP 250 OP 636: Performed by: FAMILY MEDICINE

## 2021-10-22 PROCEDURE — 49507 PRP I/HERN INIT BLOCK >5 YR: CPT | Performed by: SURGERY

## 2021-10-22 PROCEDURE — 272N000001 HC OR GENERAL SUPPLY STERILE: Performed by: SURGERY

## 2021-10-22 PROCEDURE — 710N000010 HC RECOVERY PHASE 1, LEVEL 2, PER MIN: Performed by: SURGERY

## 2021-10-22 PROCEDURE — 74176 CT ABD & PELVIS W/O CONTRAST: CPT

## 2021-10-22 PROCEDURE — 250N000011 HC RX IP 250 OP 636: Performed by: SURGERY

## 2021-10-22 PROCEDURE — 85041 AUTOMATED RBC COUNT: CPT | Performed by: INTERNAL MEDICINE

## 2021-10-22 PROCEDURE — 258N000003 HC RX IP 258 OP 636: Performed by: ANESTHESIOLOGY

## 2021-10-22 PROCEDURE — 250N000025 HC SEVOFLURANE, PER MIN: Performed by: SURGERY

## 2021-10-22 PROCEDURE — 99207 PR CDG-CODE CATEGORY CHANGED: CPT | Performed by: INTERNAL MEDICINE

## 2021-10-22 PROCEDURE — 84132 ASSAY OF SERUM POTASSIUM: CPT | Performed by: INTERNAL MEDICINE

## 2021-10-22 PROCEDURE — 99285 EMERGENCY DEPT VISIT HI MDM: CPT | Mod: 25

## 2021-10-22 PROCEDURE — 74019 RADEX ABDOMEN 2 VIEWS: CPT

## 2021-10-22 PROCEDURE — 250N000009 HC RX 250: Performed by: NURSE ANESTHETIST, CERTIFIED REGISTERED

## 2021-10-22 PROCEDURE — 82040 ASSAY OF SERUM ALBUMIN: CPT | Performed by: FAMILY MEDICINE

## 2021-10-22 PROCEDURE — 999N000141 HC STATISTIC PRE-PROCEDURE NURSING ASSESSMENT: Performed by: SURGERY

## 2021-10-22 PROCEDURE — 96372 THER/PROPH/DIAG INJ SC/IM: CPT | Performed by: FAMILY MEDICINE

## 2021-10-22 PROCEDURE — 87635 SARS-COV-2 COVID-19 AMP PRB: CPT | Performed by: FAMILY MEDICINE

## 2021-10-22 PROCEDURE — 360N000075 HC SURGERY LEVEL 2, PER MIN: Performed by: SURGERY

## 2021-10-22 PROCEDURE — 83735 ASSAY OF MAGNESIUM: CPT | Performed by: INTERNAL MEDICINE

## 2021-10-22 PROCEDURE — 96375 TX/PRO/DX INJ NEW DRUG ADDON: CPT

## 2021-10-22 PROCEDURE — 83605 ASSAY OF LACTIC ACID: CPT | Performed by: INTERNAL MEDICINE

## 2021-10-22 PROCEDURE — 36415 COLL VENOUS BLD VENIPUNCTURE: CPT | Performed by: FAMILY MEDICINE

## 2021-10-22 PROCEDURE — 96361 HYDRATE IV INFUSION ADD-ON: CPT

## 2021-10-22 PROCEDURE — 120N000001 HC R&B MED SURG/OB

## 2021-10-22 PROCEDURE — 250N000009 HC RX 250: Performed by: FAMILY MEDICINE

## 2021-10-22 PROCEDURE — 96376 TX/PRO/DX INJ SAME DRUG ADON: CPT

## 2021-10-22 PROCEDURE — 99207 PR NO CHARGE LOS: CPT | Performed by: PHYSICIAN ASSISTANT

## 2021-10-22 PROCEDURE — C1781 MESH (IMPLANTABLE): HCPCS | Performed by: SURGERY

## 2021-10-22 PROCEDURE — 99207 PR NO BILLABLE SERVICE THIS VISIT: CPT | Performed by: FAMILY MEDICINE

## 2021-10-22 PROCEDURE — 250N000011 HC RX IP 250 OP 636: Performed by: ANESTHESIOLOGY

## 2021-10-22 PROCEDURE — 250N000009 HC RX 250: Performed by: SURGERY

## 2021-10-22 PROCEDURE — 84145 PROCALCITONIN (PCT): CPT | Performed by: INTERNAL MEDICINE

## 2021-10-22 PROCEDURE — 36415 COLL VENOUS BLD VENIPUNCTURE: CPT | Performed by: INTERNAL MEDICINE

## 2021-10-22 PROCEDURE — 370N000017 HC ANESTHESIA TECHNICAL FEE, PER MIN: Performed by: SURGERY

## 2021-10-22 PROCEDURE — 83690 ASSAY OF LIPASE: CPT | Performed by: FAMILY MEDICINE

## 2021-10-22 PROCEDURE — 250N000011 HC RX IP 250 OP 636: Performed by: NURSE ANESTHETIST, CERTIFIED REGISTERED

## 2021-10-22 PROCEDURE — 93005 ELECTROCARDIOGRAM TRACING: CPT | Performed by: FAMILY MEDICINE

## 2021-10-22 PROCEDURE — 258N000003 HC RX IP 258 OP 636: Performed by: FAMILY MEDICINE

## 2021-10-22 PROCEDURE — 258N000003 HC RX IP 258 OP 636: Performed by: INTERNAL MEDICINE

## 2021-10-22 PROCEDURE — 99222 1ST HOSP IP/OBS MODERATE 55: CPT | Mod: 57 | Performed by: SURGERY

## 2021-10-22 PROCEDURE — 250N000011 HC RX IP 250 OP 636: Performed by: INTERNAL MEDICINE

## 2021-10-22 PROCEDURE — C9803 HOPD COVID-19 SPEC COLLECT: HCPCS

## 2021-10-22 PROCEDURE — 250N000013 HC RX MED GY IP 250 OP 250 PS 637: Performed by: SURGERY

## 2021-10-22 PROCEDURE — 0YU60JZ SUPPLEMENT LEFT INGUINAL REGION WITH SYNTHETIC SUBSTITUTE, OPEN APPROACH: ICD-10-PCS | Performed by: SURGERY

## 2021-10-22 PROCEDURE — 99223 1ST HOSP IP/OBS HIGH 75: CPT | Mod: AI | Performed by: INTERNAL MEDICINE

## 2021-10-22 DEVICE — PERFIX PLUG, SMALL (CONTENTS: 2)
Type: IMPLANTABLE DEVICE | Site: ABDOMEN | Status: FUNCTIONAL
Brand: PERFIX

## 2021-10-22 RX ORDER — NALOXONE HYDROCHLORIDE 0.4 MG/ML
0.2 INJECTION, SOLUTION INTRAMUSCULAR; INTRAVENOUS; SUBCUTANEOUS
Status: DISCONTINUED | OUTPATIENT
Start: 2021-10-22 | End: 2021-10-23 | Stop reason: HOSPADM

## 2021-10-22 RX ORDER — LIDOCAINE 40 MG/G
CREAM TOPICAL
Status: DISCONTINUED | OUTPATIENT
Start: 2021-10-22 | End: 2021-10-22 | Stop reason: HOSPADM

## 2021-10-22 RX ORDER — LIDOCAINE HYDROCHLORIDE 10 MG/ML
INJECTION, SOLUTION INFILTRATION; PERINEURAL PRN
Status: DISCONTINUED | OUTPATIENT
Start: 2021-10-22 | End: 2021-10-22

## 2021-10-22 RX ORDER — ACETAMINOPHEN 325 MG/1
650 TABLET ORAL EVERY 6 HOURS PRN
Status: DISCONTINUED | OUTPATIENT
Start: 2021-10-22 | End: 2021-10-23 | Stop reason: HOSPADM

## 2021-10-22 RX ORDER — CEFAZOLIN SODIUM 2 G/100ML
2 INJECTION, SOLUTION INTRAVENOUS
Status: COMPLETED | OUTPATIENT
Start: 2021-10-22 | End: 2021-10-22

## 2021-10-22 RX ORDER — LIDOCAINE 40 MG/G
CREAM TOPICAL
Status: DISCONTINUED | OUTPATIENT
Start: 2021-10-22 | End: 2021-10-23 | Stop reason: HOSPADM

## 2021-10-22 RX ORDER — DICYCLOMINE HYDROCHLORIDE 10 MG/ML
10 INJECTION INTRAMUSCULAR ONCE
Status: COMPLETED | OUTPATIENT
Start: 2021-10-22 | End: 2021-10-22

## 2021-10-22 RX ORDER — SODIUM CHLORIDE 9 MG/ML
INJECTION, SOLUTION INTRAVENOUS CONTINUOUS
Status: DISCONTINUED | OUTPATIENT
Start: 2021-10-22 | End: 2021-10-22

## 2021-10-22 RX ORDER — IPRATROPIUM BROMIDE AND ALBUTEROL SULFATE 2.5; .5 MG/3ML; MG/3ML
3 SOLUTION RESPIRATORY (INHALATION) EVERY 4 HOURS PRN
Status: DISCONTINUED | OUTPATIENT
Start: 2021-10-22 | End: 2021-10-23 | Stop reason: HOSPADM

## 2021-10-22 RX ORDER — FENTANYL CITRATE 50 UG/ML
25 INJECTION, SOLUTION INTRAMUSCULAR; INTRAVENOUS EVERY 5 MIN PRN
Status: DISCONTINUED | OUTPATIENT
Start: 2021-10-22 | End: 2021-10-22 | Stop reason: HOSPADM

## 2021-10-22 RX ORDER — HYDROCODONE BITARTRATE AND ACETAMINOPHEN 5; 325 MG/1; MG/1
1 TABLET ORAL EVERY 6 HOURS PRN
Status: DISCONTINUED | OUTPATIENT
Start: 2021-10-22 | End: 2021-10-22

## 2021-10-22 RX ORDER — SCOLOPAMINE TRANSDERMAL SYSTEM 1 MG/1
1 PATCH, EXTENDED RELEASE TRANSDERMAL ONCE
Status: DISCONTINUED | OUTPATIENT
Start: 2021-10-22 | End: 2021-10-22 | Stop reason: HOSPADM

## 2021-10-22 RX ORDER — NALOXONE HYDROCHLORIDE 0.4 MG/ML
0.4 INJECTION, SOLUTION INTRAMUSCULAR; INTRAVENOUS; SUBCUTANEOUS
Status: DISCONTINUED | OUTPATIENT
Start: 2021-10-22 | End: 2021-10-23 | Stop reason: HOSPADM

## 2021-10-22 RX ORDER — ACETAMINOPHEN 650 MG/1
650 SUPPOSITORY RECTAL EVERY 6 HOURS PRN
Status: DISCONTINUED | OUTPATIENT
Start: 2021-10-22 | End: 2021-10-23 | Stop reason: HOSPADM

## 2021-10-22 RX ORDER — ONDANSETRON 2 MG/ML
4 INJECTION INTRAMUSCULAR; INTRAVENOUS EVERY 30 MIN PRN
Status: DISCONTINUED | OUTPATIENT
Start: 2021-10-22 | End: 2021-10-22 | Stop reason: HOSPADM

## 2021-10-22 RX ORDER — ONDANSETRON 4 MG/1
4 TABLET, ORALLY DISINTEGRATING ORAL EVERY 30 MIN PRN
Status: DISCONTINUED | OUTPATIENT
Start: 2021-10-22 | End: 2021-10-22 | Stop reason: HOSPADM

## 2021-10-22 RX ORDER — ONDANSETRON 2 MG/ML
4 INJECTION INTRAMUSCULAR; INTRAVENOUS ONCE
Status: COMPLETED | OUTPATIENT
Start: 2021-10-22 | End: 2021-10-22

## 2021-10-22 RX ORDER — OXYCODONE HYDROCHLORIDE 5 MG/1
5-10 TABLET ORAL EVERY 4 HOURS PRN
Status: DISCONTINUED | OUTPATIENT
Start: 2021-10-22 | End: 2021-10-23 | Stop reason: HOSPADM

## 2021-10-22 RX ORDER — PANTOPRAZOLE SODIUM 20 MG/1
40 TABLET, DELAYED RELEASE ORAL
Status: DISCONTINUED | OUTPATIENT
Start: 2021-10-22 | End: 2021-10-23 | Stop reason: HOSPADM

## 2021-10-22 RX ORDER — SODIUM CHLORIDE, SODIUM LACTATE, POTASSIUM CHLORIDE, CALCIUM CHLORIDE 600; 310; 30; 20 MG/100ML; MG/100ML; MG/100ML; MG/100ML
INJECTION, SOLUTION INTRAVENOUS CONTINUOUS
Status: DISCONTINUED | OUTPATIENT
Start: 2021-10-22 | End: 2021-10-22 | Stop reason: HOSPADM

## 2021-10-22 RX ORDER — FENTANYL CITRATE 50 UG/ML
INJECTION, SOLUTION INTRAMUSCULAR; INTRAVENOUS PRN
Status: DISCONTINUED | OUTPATIENT
Start: 2021-10-22 | End: 2021-10-22

## 2021-10-22 RX ORDER — HYDROMORPHONE HCL IN WATER/PF 6 MG/30 ML
0.2 PATIENT CONTROLLED ANALGESIA SYRINGE INTRAVENOUS EVERY 5 MIN PRN
Status: DISCONTINUED | OUTPATIENT
Start: 2021-10-22 | End: 2021-10-22 | Stop reason: HOSPADM

## 2021-10-22 RX ORDER — HYDROMORPHONE HYDROCHLORIDE 1 MG/ML
0.5 INJECTION, SOLUTION INTRAMUSCULAR; INTRAVENOUS; SUBCUTANEOUS ONCE
Status: COMPLETED | OUTPATIENT
Start: 2021-10-22 | End: 2021-10-22

## 2021-10-22 RX ORDER — PROPOFOL 10 MG/ML
INJECTION, EMULSION INTRAVENOUS PRN
Status: DISCONTINUED | OUTPATIENT
Start: 2021-10-22 | End: 2021-10-22

## 2021-10-22 RX ORDER — MAGNESIUM SULFATE 4 G/50ML
4 INJECTION INTRAVENOUS ONCE
Status: COMPLETED | OUTPATIENT
Start: 2021-10-22 | End: 2021-10-22

## 2021-10-22 RX ORDER — LIDOCAINE HYDROCHLORIDE 20 MG/ML
10 JELLY TOPICAL ONCE
Status: COMPLETED | OUTPATIENT
Start: 2021-10-22 | End: 2021-10-22

## 2021-10-22 RX ORDER — KETAMINE HYDROCHLORIDE 50 MG/ML
INJECTION, SOLUTION INTRAMUSCULAR; INTRAVENOUS PRN
Status: DISCONTINUED | OUTPATIENT
Start: 2021-10-22 | End: 2021-10-22

## 2021-10-22 RX ORDER — OXYCODONE HYDROCHLORIDE 5 MG/1
5 TABLET ORAL EVERY 4 HOURS PRN
Status: DISCONTINUED | OUTPATIENT
Start: 2021-10-22 | End: 2021-10-22

## 2021-10-22 RX ORDER — ALBUTEROL SULFATE 90 UG/1
1-2 AEROSOL, METERED RESPIRATORY (INHALATION) EVERY 4 HOURS PRN
Status: DISCONTINUED | OUTPATIENT
Start: 2021-10-22 | End: 2021-10-23 | Stop reason: HOSPADM

## 2021-10-22 RX ORDER — ONDANSETRON 2 MG/ML
INJECTION INTRAMUSCULAR; INTRAVENOUS PRN
Status: DISCONTINUED | OUTPATIENT
Start: 2021-10-22 | End: 2021-10-22

## 2021-10-22 RX ORDER — SODIUM CHLORIDE 9 MG/ML
INJECTION, SOLUTION INTRAVENOUS CONTINUOUS
Status: DISCONTINUED | OUTPATIENT
Start: 2021-10-22 | End: 2021-10-23 | Stop reason: HOSPADM

## 2021-10-22 RX ORDER — BUPIVACAINE HYDROCHLORIDE AND EPINEPHRINE 5; 5 MG/ML; UG/ML
INJECTION, SOLUTION EPIDURAL; INTRACAUDAL; PERINEURAL PRN
Status: DISCONTINUED | OUTPATIENT
Start: 2021-10-22 | End: 2021-10-22 | Stop reason: HOSPADM

## 2021-10-22 RX ORDER — HYDROMORPHONE HYDROCHLORIDE 1 MG/ML
0.3 INJECTION, SOLUTION INTRAMUSCULAR; INTRAVENOUS; SUBCUTANEOUS EVERY 4 HOURS PRN
Status: DISCONTINUED | OUTPATIENT
Start: 2021-10-22 | End: 2021-10-23 | Stop reason: HOSPADM

## 2021-10-22 RX ORDER — LOPERAMIDE HCL 2 MG
2 CAPSULE ORAL 2 TIMES DAILY PRN
Status: ON HOLD | COMMUNITY
End: 2021-10-23

## 2021-10-22 RX ORDER — FENTANYL CITRATE 50 UG/ML
50 INJECTION, SOLUTION INTRAMUSCULAR; INTRAVENOUS
Status: DISCONTINUED | OUTPATIENT
Start: 2021-10-22 | End: 2021-10-22 | Stop reason: HOSPADM

## 2021-10-22 RX ADMIN — OXYCODONE HYDROCHLORIDE 10 MG: 5 TABLET ORAL at 21:57

## 2021-10-22 RX ADMIN — HYDROMORPHONE HYDROCHLORIDE 0.3 MG: 1 INJECTION, SOLUTION INTRAMUSCULAR; INTRAVENOUS; SUBCUTANEOUS at 18:30

## 2021-10-22 RX ADMIN — SODIUM CHLORIDE: 9 INJECTION, SOLUTION INTRAVENOUS at 06:11

## 2021-10-22 RX ADMIN — ONDANSETRON 4 MG: 2 INJECTION INTRAMUSCULAR; INTRAVENOUS at 16:03

## 2021-10-22 RX ADMIN — MAGNESIUM SULFATE HEPTAHYDRATE 4 G: 80 INJECTION, SOLUTION INTRAVENOUS at 14:21

## 2021-10-22 RX ADMIN — LIDOCAINE HYDROCHLORIDE 3 ML: 10 INJECTION, SOLUTION INFILTRATION; PERINEURAL at 15:55

## 2021-10-22 RX ADMIN — ONDANSETRON 4 MG: 2 INJECTION INTRAMUSCULAR; INTRAVENOUS at 02:13

## 2021-10-22 RX ADMIN — HYDROMORPHONE HYDROCHLORIDE 0.3 MG: 1 INJECTION, SOLUTION INTRAMUSCULAR; INTRAVENOUS; SUBCUTANEOUS at 06:51

## 2021-10-22 RX ADMIN — CEFAZOLIN SODIUM 2 G: 2 INJECTION, SOLUTION INTRAVENOUS at 15:48

## 2021-10-22 RX ADMIN — KETAMINE HYDROCHLORIDE 30 MG: 50 INJECTION, SOLUTION INTRAMUSCULAR; INTRAVENOUS at 16:47

## 2021-10-22 RX ADMIN — SODIUM CHLORIDE, POTASSIUM CHLORIDE, SODIUM LACTATE AND CALCIUM CHLORIDE: 600; 310; 30; 20 INJECTION, SOLUTION INTRAVENOUS at 14:11

## 2021-10-22 RX ADMIN — MIDAZOLAM 2 MG: 1 INJECTION INTRAMUSCULAR; INTRAVENOUS at 15:47

## 2021-10-22 RX ADMIN — FENTANYL CITRATE 100 MCG: 50 INJECTION, SOLUTION INTRAMUSCULAR; INTRAVENOUS at 15:51

## 2021-10-22 RX ADMIN — ROCURONIUM BROMIDE 50 MG: 10 INJECTION, SOLUTION INTRAVENOUS at 15:55

## 2021-10-22 RX ADMIN — SUGAMMADEX 200 MG: 100 INJECTION, SOLUTION INTRAVENOUS at 17:08

## 2021-10-22 RX ADMIN — DICYCLOMINE HYDROCHLORIDE 10 MG: 10 INJECTION INTRAMUSCULAR at 02:56

## 2021-10-22 RX ADMIN — SODIUM CHLORIDE 1000 ML: 9 INJECTION, SOLUTION INTRAVENOUS at 02:13

## 2021-10-22 RX ADMIN — HYDROMORPHONE HYDROCHLORIDE 0.3 MG: 1 INJECTION, SOLUTION INTRAMUSCULAR; INTRAVENOUS; SUBCUTANEOUS at 10:55

## 2021-10-22 RX ADMIN — HYDROMORPHONE HYDROCHLORIDE 0.5 MG: 1 INJECTION, SOLUTION INTRAMUSCULAR; INTRAVENOUS; SUBCUTANEOUS at 03:19

## 2021-10-22 RX ADMIN — HYDROMORPHONE HYDROCHLORIDE 0.5 MG: 1 INJECTION, SOLUTION INTRAMUSCULAR; INTRAVENOUS; SUBCUTANEOUS at 05:17

## 2021-10-22 RX ADMIN — PROPOFOL 130 MG: 10 INJECTION, EMULSION INTRAVENOUS at 15:55

## 2021-10-22 RX ADMIN — HYDROMORPHONE HYDROCHLORIDE 1 MG: 1 INJECTION, SOLUTION INTRAMUSCULAR; INTRAVENOUS; SUBCUTANEOUS at 13:00

## 2021-10-22 RX ADMIN — SODIUM CHLORIDE, POTASSIUM CHLORIDE, SODIUM LACTATE AND CALCIUM CHLORIDE 1000 ML: 600; 310; 30; 20 INJECTION, SOLUTION INTRAVENOUS at 13:54

## 2021-10-22 RX ADMIN — SODIUM CHLORIDE, POTASSIUM CHLORIDE, SODIUM LACTATE AND CALCIUM CHLORIDE: 600; 310; 30; 20 INJECTION, SOLUTION INTRAVENOUS at 16:23

## 2021-10-22 RX ADMIN — LIDOCAINE HYDROCHLORIDE 5 ML: 20 JELLY TOPICAL at 04:05

## 2021-10-22 ASSESSMENT — ACTIVITIES OF DAILY LIVING (ADL)
CONCENTRATING,_REMEMBERING_OR_MAKING_DECISIONS_DIFFICULTY: NO
ADLS_ACUITY_SCORE: 8
ADLS_ACUITY_SCORE: 6
TOILETING_ISSUES: NO
WALKING_OR_CLIMBING_STAIRS_DIFFICULTY: NO
ADLS_ACUITY_SCORE: 8
DRESSING/BATHING_DIFFICULTY: NO
ADLS_ACUITY_SCORE: 8
HEARING_DIFFICULTY_OR_DEAF: NO
ADLS_ACUITY_SCORE: 8
ADLS_ACUITY_SCORE: 8
DOING_ERRANDS_INDEPENDENTLY_DIFFICULTY: NO
ADLS_ACUITY_SCORE: 6
ADLS_ACUITY_SCORE: 8
FALL_HISTORY_WITHIN_LAST_SIX_MONTHS: NO
VISION_MANAGEMENT: GLASSES
ADLS_ACUITY_SCORE: 8
DIFFICULTY_EATING/SWALLOWING: NO
ADLS_ACUITY_SCORE: 8
WEAR_GLASSES_OR_BLIND: YES
DIFFICULTY_COMMUNICATING: NO
PATIENT_/_FAMILY_COMMUNICATION_STYLE: SPOKEN LANGUAGE (ENGLISH OR BILINGUAL)
ADLS_ACUITY_SCORE: 5
ADLS_ACUITY_SCORE: 8

## 2021-10-22 ASSESSMENT — ENCOUNTER SYMPTOMS
VOMITING: 1
NAUSEA: 1
ABDOMINAL PAIN: 1
CONSTIPATION: 1

## 2021-10-22 ASSESSMENT — LIFESTYLE VARIABLES: TOBACCO_USE: 1

## 2021-10-22 NOTE — ANESTHESIA PROCEDURE NOTES
Airway       Patient location during procedure: OR       Procedure Start/Stop Times: 10/22/2021 3:58 PM  Staff -        CRNA: Dennis Palacio APRN CRNA       Performed By: CRNA  Consent for Airway        Urgency: elective  Indications and Patient Condition       Indications for airway management: mandy-procedural        Final Airway Details       Final airway type: endotracheal airway       Successful airway: ETT - single  Endotracheal Airway Details        ETT size (mm): 6.5       Cuffed: yes       Successful intubation technique: video laryngoscopy       VL Blade Size: Glidescope 3       Grade View of Cords: 1       Adjucts: stylet       Position: Right       Measured from: lips       Secured at (cm): 22    Post intubation assessment        Placement verified by: capnometry, equal breath sounds and chest rise        Number of attempts at approach: 1       Ease of procedure: easy       Dentition: Intact and Unchanged    Additional Comments       Elective Glidescope

## 2021-10-22 NOTE — ED TRIAGE NOTES
Pt reports being diagnosed with inguinal hernia yesterday. Since this, he has been nauseous and unable to keep down fluids. Last BM was early Thursday with small output. Pt reports hx of IBS.

## 2021-10-22 NOTE — ANESTHESIA POSTPROCEDURE EVALUATION
Patient: Jose Boyle    Procedure: Procedure(s):  HERNIORRHAPHY, INGUINAL, OPEN, LEFT       Diagnosis:Inguinal hernia of left side with obstruction [K40.30]  Diagnosis Additional Information: No value filed.    Anesthesia Type:  General    Note:     Postop Pain Control: Uneventful            Sign Out: Well controlled pain   PONV: No   Neuro/Psych: Uneventful            Sign Out: Acceptable/Baseline neuro status   Airway/Respiratory: Uneventful            Sign Out: Acceptable/Baseline resp. status   CV/Hemodynamics: Uneventful            Sign Out: Acceptable CV status; No obvious hypovolemia; No obvious fluid overload   Other NRE: NONE   DID A NON-ROUTINE EVENT OCCUR? No           Last vitals:  Vitals Value Taken Time   /80 10/22/21 1740   Temp 37.3  C (99.1  F) 10/22/21 1732   Pulse 98 10/22/21 1746   Resp 24 10/22/21 1746   SpO2 92 % 10/22/21 1746   Vitals shown include unvalidated device data.    Electronically Signed By: Jimmy Griffin MD  October 22, 2021  6:32 PM

## 2021-10-22 NOTE — H&P
[facility] MEDICINE ADMISSION HISTORY AND PHYSICAL       Assessment & Plan      1. Acute left inguino-scrotal mass c/w hernia complicated by SBO. Lactic acid was normal. Failed to reduce.    - IVF, NPO, pain meds, anti emetics  - gen surgery  - AM labs   - check NG placement  - electrolyte protocol    2. Pre-op evaluation - Denies chest pain or shortness of breath. Denies history of CAD, CHF, severe valvular heart disease, or life threatening arrythmmia. Functional capacity - walks 7 miles.  Based on RCRI, patient has low cardiac risk for contemplated surgical. We did talk about risks including but not limited to: SCD, AMI, or CVA. No indication for pre-op beta blockade or stress dose steroids. Not on thinners.       3. Chronic cough, Tobacco abuse since 14 years old - counseled.  Advised to get PFTs     650A - NG adjusted       VTE prophylaxis: ambulate   IV fluids: Per order set   Diet:  NPO,  GI prophylaxis: Not indicated at this point, re-assess if needed.   Pain, sleep, and vomiting medications: Per order set  Code Status: Full   COVID test result:  negative   COVID vaccination: completed  Barriers to discharge: admitting clinical condition  Discharge Disposition and goals:  Unable to determine at this point, pending clinical progress and response to treatment. Patient may need transfer to SNF or ACR if unsafe to go home and needed treatment inappropriate at home setting OR may need home health care evaluation if care can be delivered at home settings. Consider referral to care manager/        Care plan was created based on available information provided, including patient's condition at the time of encounter.   This plan was discussed with patient and/or family members using layman's terms and have agreed to proceed.   At the end of night shift (9PM - 730A), this case will be presented to the AM Hospitalist.    It is recommended to revise care plan if there is change in  condition and/or new clinical information that are not available during my encounter.     All or some of home medication/s were not resumed on admission due to safety reasons or contraindications. Dosing and frequency may also have been modified. Please resume/review them during your visit.     70 minutes of total visit duration and greater than 50% was spent in direct evaluation of patient and coordination of care including discussion of diagnostic test results and recommended treatment. .      Redd De Los Santos MD, MPH, FACP, Pending sale to [facility]  Internal Medicine - Hospitalist        Chief Complaint Left inguino-scrotal pain      HISTORY     - Was seen in the ED yesterday for Left inguino-scrotal pain and seemed it was reduced. However, his pain recur and had been vomiting a lot. Unable to keep things down and feels dehydrated.   - Not SOB but has chronic cough. He had been smoking since he was 14 years old. No formal COPD diagnosis.   - No diarrhea. No urinary symptoms. No fevers.  - In the ED, CT showed --   IMPRESSION:   1.  Distal small bowel obstruction caused by a short segment of small bowel within a left inguinal hernia.  2.  Small amount of ascites.     - He was referred to general surgery service and plans for surgery this AM. NG was inserted.     - ROS --- No headache. No dizziness. No weakness. No CP or SOB. No palpitations. No urinary symptoms. No bleeding symptoms. No weight loss. Rest of 12 point ROS was reviewed and negative.       Past Medical History     Past Medical History:   Diagnosis Date     Gastritis     alcoholism     Hiatal hernia      Irritable bowel syndrome      Kidney stones          Surgical History     Past Surgical History:   Procedure Laterality Date     ANTERIOR CERVICAL DISCECTOMY W/ FUSION  2005    C4-5, C5-6     CLOSED REDUCTION, PERCUTANEOUS PINNING  HAND, COMBINED       IR CERVICAL EPIDURAL STEROID INJECTION  6/27/2005        Family History      Family History   Problem Relation Age of  Onset     Cancer Mother      Multiple Sclerosis Mother      Heart Disease Father      Diabetes Father          Social History      .  Social History     Socioeconomic History     Marital status:      Spouse name: Not on file     Number of children: Not on file     Years of education: Not on file     Highest education level: Not on file   Occupational History     Not on file   Tobacco Use     Smoking status: Current Every Day Smoker     Packs/day: 1.00     Years: 35.00     Pack years: 35.00     Types: Cigarettes     Smokeless tobacco: Never Used   Substance and Sexual Activity     Alcohol use: Not on file     Drug use: Not on file     Sexual activity: Not on file   Other Topics Concern     Not on file   Social History Narrative     Not on file     Social Determinants of Health     Financial Resource Strain:      Difficulty of Paying Living Expenses:    Food Insecurity:      Worried About Running Out of Food in the Last Year:      Ran Out of Food in the Last Year:    Transportation Needs:      Lack of Transportation (Medical):      Lack of Transportation (Non-Medical):    Physical Activity:      Days of Exercise per Week:      Minutes of Exercise per Session:    Stress:      Feeling of Stress :    Social Connections:      Frequency of Communication with Friends and Family:      Frequency of Social Gatherings with Friends and Family:      Attends Judaism Services:      Active Member of Clubs or Organizations:      Attends Club or Organization Meetings:      Marital Status:    Intimate Partner Violence:      Fear of Current or Ex-Partner:      Emotionally Abused:      Physically Abused:      Sexually Abused:           Allergies        Allergies   Allergen Reactions     Ibuprofen Nausea         Prior to Admission Medications      [COMPLETED] iopamidol (ISOVUE-370) solution 100 mL  [COMPLETED] ketorolac (TORADOL) injection 30 mg  [COMPLETED] morphine (PF) injection 4 mg    albuterol (PROAIR HFA/PROVENTIL  HFA/VENTOLIN HFA) 108 (90 Base) MCG/ACT inhaler, Inhale 1-2 puffs into the lungs every 4 hours as needed for shortness of breath / dyspnea or wheezing  dicyclomine (BENTYL) 10 MG capsule, [DICYCLOMINE (BENTYL) 10 MG CAPSULE] Take 1 capsule by mouth.  MULTIVITAMIN ORAL, [MULTIVITAMIN ORAL] Take by mouth.  omeprazole (PRILOSEC) 20 MG DR capsule, Take 1 capsule by mouth once daily            Review of Systems     A 12 point comprehensive review of systems was negative except as noted above in HPI.    PHYSICAL EXAMINATION       Vitals      Vitals: /80   Pulse 81   Temp 98  F (36.7  C) (Oral)   Resp 22   Wt 65 kg (143 lb 4.8 oz)   SpO2 93%   BMI 21.79 kg/m    BMI= Body mass index is 21.79 kg/m .      Examination     General Appearance:  Alert, cooperative, no distress  Head:    Normocephalic, without obvious abnormality, atraumatic  EENT:  PERRL, conjunctiva/corneas clear, EOM's intact.   Neck:   Supple, symmetrical, trachea midline, no adenopathy; no NVE  Back:  Symmetric, no curvature, no CVA tenderness  Chest/Lungs: Clear to auscultation bilaterally, respirations unlabored, No tenderness or deformity. No abdominal breathing or use of accessory muscles.   Heart:    Regular rate and rhythm, S1 and S2 normal, no murmur, rub   or gallop  Abdomen: Soft, non-tender, bowel sounds active all four quadrants, not peritoneal on palpation. Not distended. Left inguinoscrotal mass and tender  Extremities:  Extremities normal, atraumatic, no swelling   Skin:  Skin color, texture, turgor normal, no rashes or lesion  Neurologic:  Awake and alert, No lateralizing or localizing signs        Pertinent Lab     Results for orders placed or performed during the hospital encounter of 10/22/21   Abdomen XR, 2 vw, flat and upright    Impression    IMPRESSION: Dilated loops of small bowel and air-fluid levels consistent with small bowel obstruction. Contrast within the bladder. Lung bases clear.    CT Abdomen Pelvis w/o Contrast     Impression    IMPRESSION:   1.  Distal small bowel obstruction caused by a short segment of small bowel within a left inguinal hernia.  2.  Small amount of ascites.     Hepatic function panel   Result Value Ref Range    Bilirubin Total 1.8 (H) 0.0 - 1.0 mg/dL    Bilirubin Direct 0.5 <=0.5 mg/dL    Protein Total 7.5 6.0 - 8.0 g/dL    Albumin 4.3 3.5 - 5.0 g/dL    Alkaline Phosphatase 82 45 - 120 U/L    AST 21 0 - 40 U/L    ALT 14 0 - 45 U/L   Result Value Ref Range    Lipase 31 0 - 52 U/L   Asymptomatic COVID-19 Virus (Coronavirus) by PCR Nasopharyngeal    Specimen: Nasopharyngeal; Swab   Result Value Ref Range    SARS CoV2 PCR Negative Negative           Pertinent Radiology

## 2021-10-22 NOTE — ED PROVIDER NOTES
EMERGENCY DEPARTMENT ENCOUNTER      NAME: Jose Boyle  AGE: 56 year old male  YOB: 1965  MRN: [id number]  EVALUATION DATE & TIME: No admission date for patient encounter.    PCP: Trish Munoz    ED PROVIDER: Taurus Grier M.D.    Chief Complaint   Patient presents with     Nausea & Vomiting     Constipation     FINAL IMPRESSION:  1. Inguinal hernia of left side with obstruction      ED COURSE & MEDICAL DECISION MAKING:    Pertinent Labs & Imaging studies personally reviewed and interpreted by me. (See chart for details)  2:01 AM Patient seen and examined, prior records reviewed. PPE worn: surgical mask and gloves.  Differential diagnosis includes but not limited to gastritis, cholecystitis, pancreatitis, colitis, enteritis, diverticulitis, urinary tract infection, myocardial infarction, pneumonia appendicitis, AAA, cholelithiasis, ischemic bowel.  Patient presents with abdominal pain, nausea and vomiting.  He was seen yesterday morning and found to have an inguinal hernia, CT scan at that time demonstrated developing small bowel incarceration/strangulation with developing obstruction, it sounds like hernia was reduced and patient's pain was improved and so he was discharged.  On exam today, high-pitched bowel sounds and generalized abdominal tenderness consistent with possible small bowel obstruction, left inguinal hernia is out and is easily reduced, somewhat tender.  Labs and abdominal x-ray ordered as patient already had a CT scan done within the last 24 hours.  2:55 AM x-ray personally reviewed and interpreted by me demonstrates small bowel obstruction, suspect that this is from incarcerated hernia.  CT scan ordered.  Labs are otherwise reassuring.  3:17 AM I reassessed the patient. His hernia was out again was easily reduced again. Diladid IV was ordered.  Patient was sitting upright when he was seen, will keep him flat to see if this helps maintain reduction of his hernia.   Patient will need to be admitted for evaluation for surgical repair of this hernia as it does not maintain reduction and is now likely causing small bowel obstruction.  4:01 AM I personally reviewed patient's CT scan which demonstrates small bowel obstruction due to small liquid bowel still in the left inguinal hernia.  NG tube will be placed and will attempt reduction again after bowel decompression with NG tube.  Will discuss with surgery and admitting physician.  5:03 AM NG tube in place and to suction, hernia was again reduced but comes out again almost immediately.  Will contact surgery.  Patient says pain was improved after Dilaudid IV but is starting to return, additional Dilaudid IV is ordered.  5:16 AM Spoke to Dr. Browne, general surgeon who recommends admission and plan for surgery around 10 AM.   5:35 AM Spoke to Dr. De Los Santos, hospitalist who accepts patient for admission.   6:00 AM Patient was signed out to incoming ED provider at the end of my shift.        At the conclusion of the encounter I discussed the results of all of the tests and the disposition. The questions were answered. The patient or family acknowledged understanding and was agreeable with the care plan.     PROCEDURES:   Procedures    MEDICATIONS GIVEN IN THE EMERGENCY:  Medications   lidocaine 1 % 0.1-1 mL (has no administration in time range)   lidocaine (LMX4) cream (has no administration in time range)   sodium chloride (PF) 0.9% PF flush 3 mL (has no administration in time range)   sodium chloride (PF) 0.9% PF flush 3 mL (has no administration in time range)   melatonin tablet 1 mg (has no administration in time range)   sodium chloride 0.9% infusion (has no administration in time range)   acetaminophen (TYLENOL) tablet 650 mg (has no administration in time range)     Or   acetaminophen (TYLENOL) Suppository 650 mg (has no administration in time range)   HYDROmorphone (PF) (DILAUDID) injection 0.3 mg (has no administration in time  range)   HYDROcodone-acetaminophen (NORCO) 5-325 MG per tablet 1 tablet (has no administration in time range)   prochlorperazine (COMPAZINE) injection 5 mg (has no administration in time range)   ondansetron (ZOFRAN) injection 4 mg (4 mg Intravenous Given 10/22/21 0213)   0.9% sodium chloride BOLUS (0 mLs Intravenous Stopped 10/22/21 0317)   dicyclomine (BENTYL) injection 10 mg (10 mg Intramuscular Given 10/22/21 0256)   HYDROmorphone (PF) (DILAUDID) injection 0.5 mg (0.5 mg Intravenous Given 10/22/21 0319)   lidocaine (XYLOCAINE) 2 % external gel 10 mL (5 mLs Topical Given 10/22/21 0405)   HYDROmorphone (PF) (DILAUDID) injection 0.5 mg (0.5 mg Intravenous Given 10/22/21 0517)       NEW PRESCRIPTIONS STARTED AT TODAY'S ER VISIT  New Prescriptions    No medications on file       =================================================================    HPI    Patient information was obtained from: patient       Jose oByle is a 56 year old male with a pertinent history of gastritis, kidney stones, hiatal hernia, celiac disease, diverticular disease, GERD, hemorrhoids who presents to this ED via walk in for evaluation of constipation, nausea and vomiting.     Per chart review, patient was seen on 10/21/21 at [facility] ED for abdominal pain. They obtained lab work from the patient including BMP, HFP which were abnormal and Lipase, CBC with platelets and differential that was within normal limits. They did diagnostic imaging including a CT Abdomen Pelvis w Contrast that showed left inguinal hernia containing abnormal small bowel. Small bowel wall is hyperenhancing and possibly twisted with focal fluid-filled dilatation within this hernia Developing small bowel incarceration / strangulation within the differential. Proximal to this small bowel containing left inguinal hernia, mild asymmetric increased caliber of small bowel with small bowel feces, consistent with slow transit or developing obstruction. No bowel wall  "pneumatosis. No free air or abscess. Surgeon was consulted and patient was observed for an hour until his pain resolves during ED stay. Patient was discharged home in stable condition.     Patient reports he was diagnosed with an inguinal hernia on 10/21 and has since developed nausea, and vomiting. He has not been able to keep any fluids or solid foods down well and regurgitates them immediately.  Prior to arrival, he has not taken any medications.     He also endorses associated abdominal pain that wraps to his back and constipation. He does feel like his abdomen is more distended than usual. He says that he usually passes a bowel movement \"several times a day\" which is unusual that his last bowel movement was yesterday.     He usually takes dicyclomine and omeprazole regularly. He last took dicyclomine at 6 AM on 10/21. He has known allergy to Ibuprofen. He denies any other associated symptoms or more concerns at this time.       REVIEW OF SYSTEMS   Review of Systems   HENT:        Positive for regurgitating   Gastrointestinal: Positive for abdominal pain, constipation, nausea and vomiting.   All other systems reviewed and are negative.     All other systems reviewed and negative    PAST MEDICAL HISTORY:  Past Medical History:   Diagnosis Date     Gastritis     alcoholism     Hiatal hernia      Irritable bowel syndrome      Kidney stones        PAST SURGICAL HISTORY:  Past Surgical History:   Procedure Laterality Date     ANTERIOR CERVICAL DISCECTOMY W/ FUSION  2005    C4-5, C5-6     CLOSED REDUCTION, PERCUTANEOUS PINNING  HAND, COMBINED       IR CERVICAL EPIDURAL STEROID INJECTION  6/27/2005       CURRENT MEDICATIONS:    Current Facility-Administered Medications   Medication     acetaminophen (TYLENOL) tablet 650 mg    Or     acetaminophen (TYLENOL) Suppository 650 mg     HYDROcodone-acetaminophen (NORCO) 5-325 MG per tablet 1 tablet     HYDROmorphone (PF) (DILAUDID) injection 0.3 mg     lidocaine (LMX4) cream "     lidocaine 1 % 0.1-1 mL     melatonin tablet 1 mg     prochlorperazine (COMPAZINE) injection 5 mg     sodium chloride (PF) 0.9% PF flush 3 mL     sodium chloride (PF) 0.9% PF flush 3 mL     sodium chloride 0.9% infusion     Current Outpatient Medications   Medication     albuterol (PROAIR HFA/PROVENTIL HFA/VENTOLIN HFA) 108 (90 Base) MCG/ACT inhaler     dicyclomine (BENTYL) 10 MG capsule     MULTIVITAMIN ORAL     omeprazole (PRILOSEC) 20 MG DR capsule       ALLERGIES:  Allergies   Allergen Reactions     Ibuprofen Nausea       FAMILY HISTORY:  Family History   Problem Relation Age of Onset     Cancer Mother      Multiple Sclerosis Mother      Heart Disease Father      Diabetes Father        SOCIAL HISTORY:   Social History     Socioeconomic History     Marital status:      Spouse name: Not on file     Number of children: Not on file     Years of education: Not on file     Highest education level: Not on file   Occupational History     Not on file   Tobacco Use     Smoking status: Current Every Day Smoker     Packs/day: 1.00     Years: 35.00     Pack years: 35.00     Types: Cigarettes     Smokeless tobacco: Never Used   Substance and Sexual Activity     Alcohol use: Not on file     Drug use: Not on file     Sexual activity: Not on file   Other Topics Concern     Not on file   Social History Narrative     Not on file     Social Determinants of Health     Financial Resource Strain:      Difficulty of Paying Living Expenses:    Food Insecurity:      Worried About Running Out of Food in the Last Year:      Ran Out of Food in the Last Year:    Transportation Needs:      Lack of Transportation (Medical):      Lack of Transportation (Non-Medical):    Physical Activity:      Days of Exercise per Week:      Minutes of Exercise per Session:    Stress:      Feeling of Stress :    Social Connections:      Frequency of Communication with Friends and Family:      Frequency of Social Gatherings with Friends and Family:       Attends Alevism Services:      Active Member of Clubs or Organizations:      Attends Club or Organization Meetings:      Marital Status:    Intimate Partner Violence:      Fear of Current or Ex-Partner:      Emotionally Abused:      Physically Abused:      Sexually Abused:        VITALS:  /80   Pulse 81   Temp 98  F (36.7  C) (Oral)   Resp 22   Wt 65 kg (143 lb 4.8 oz)   SpO2 93%   BMI 21.79 kg/m      PHYSICAL EXAM:  Physical Exam  Vitals and nursing note reviewed.   Constitutional:       Appearance: Normal appearance.   HENT:      Head: Normocephalic and atraumatic.      Right Ear: External ear normal.      Left Ear: External ear normal.      Nose: Nose normal.      Mouth/Throat:      Mouth: Mucous membranes are moist.   Eyes:      Extraocular Movements: Extraocular movements intact.      Conjunctiva/sclera: Conjunctivae normal.      Pupils: Pupils are equal, round, and reactive to light.   Cardiovascular:      Rate and Rhythm: Normal rate and regular rhythm.   Pulmonary:      Effort: Pulmonary effort is normal.      Breath sounds: Normal breath sounds. No wheezing or rales.   Abdominal:      General: Abdomen is flat. There is no distension.      Palpations: Abdomen is soft.      Tenderness: There is no abdominal tenderness. There is no guarding.      Comments: Hyperactive bowel sounds. Diffuse abdominal tenderness. Left inguinal hernia with loop of bowel, reducible   Musculoskeletal:         General: Normal range of motion.      Cervical back: Normal range of motion and neck supple.      Right lower leg: No edema.      Left lower leg: No edema.   Lymphadenopathy:      Cervical: No cervical adenopathy.   Skin:     General: Skin is warm and dry.   Neurological:      General: No focal deficit present.      Mental Status: He is alert and oriented to person, place, and time. Mental status is at baseline.      Comments: No gross focal neurologic deficits   Psychiatric:         Mood and Affect: Mood  normal.         Behavior: Behavior normal.         Thought Content: Thought content normal.          LAB:  All pertinent labs reviewed and interpreted.  Results for orders placed or performed during the hospital encounter of 10/22/21   Abdomen XR, 2 vw, flat and upright    Impression    IMPRESSION: Dilated loops of small bowel and air-fluid levels consistent with small bowel obstruction. Contrast within the bladder. Lung bases clear.    CT Abdomen Pelvis w/o Contrast    Impression    IMPRESSION:   1.  Distal small bowel obstruction caused by a short segment of small bowel within a left inguinal hernia.  2.  Small amount of ascites.     Hepatic function panel   Result Value Ref Range    Bilirubin Total 1.8 (H) 0.0 - 1.0 mg/dL    Bilirubin Direct 0.5 <=0.5 mg/dL    Protein Total 7.5 6.0 - 8.0 g/dL    Albumin 4.3 3.5 - 5.0 g/dL    Alkaline Phosphatase 82 45 - 120 U/L    AST 21 0 - 40 U/L    ALT 14 0 - 45 U/L   Result Value Ref Range    Lipase 31 0 - 52 U/L   Asymptomatic COVID-19 Virus (Coronavirus) by PCR Nasopharyngeal    Specimen: Nasopharyngeal; Swab   Result Value Ref Range    SARS CoV2 PCR Negative Negative       EKG:  EKG personally reviewed and interpreted by me performed at 5:39 AM demonstrates sinus rhythm rate 72, no acute ST elevations or depressions, normal intervals, normal axis, .  No prior for comparison.      RADIOLOGY:  Reviewed all pertinent imaging. Please see official radiology report.  CT Abdomen Pelvis w/o Contrast   Final Result   IMPRESSION:    1.  Distal small bowel obstruction caused by a short segment of small bowel within a left inguinal hernia.   2.  Small amount of ascites.         Abdomen XR, 2 vw, flat and upright   Final Result   IMPRESSION: Dilated loops of small bowel and air-fluid levels consistent with small bowel obstruction. Contrast within the bladder. Lung bases clear.       XR Chest Port 1 View    (Results Pending)       Julia BUTCHER, am serving as a scribe to  document services personally performed by Dr. Grier based on my observation and the provider's statements to me. I, Taurus Grier MD attest that Julia Crouch is acting in a scribe capacity, has observed my performance of the services and has documented them in accordance with my direction.    Taurus Grier M.D.  Emergency Medicine  [facility] EMERGENCY ROOM  [address] [place] [address]  [phone]  Dept: [phone]     Taurus Grier MD  10/22/21 0551

## 2021-10-22 NOTE — PHARMACY-ADMISSION MEDICATION HISTORY
Pharmacy Note - Admission Medication History    Pertinent Provider Information: -     ______________________________________________________________________    Prior To Admission (PTA) med list completed and updated in EMR.       PTA Med List   Medication Sig Last Dose     albuterol (PROAIR HFA/PROVENTIL HFA/VENTOLIN HFA) 108 (90 Base) MCG/ACT inhaler Inhale 1-2 puffs into the lungs every 4 hours as needed for shortness of breath / dyspnea or wheezing Past Week at Unknown time     dicyclomine (BENTYL) 10 MG capsule Take 1 capsule by mouth 4 times daily (before meals and nightly)  10/21/2021 at AM     loperamide (IMODIUM) 2 MG capsule Take 2 mg by mouth 2 times daily as needed for diarrhea Past Week at Unknown time     MULTIVITAMIN ORAL Take 1 tablet by mouth every morning  10/21/2021 at AM     omeprazole (PRILOSEC) 20 MG DR capsule Take 1 capsule by mouth once daily 10/21/2021 at AM       Information source(s): Patient and CareEverywhere/[facility]  Method of interview communication: phone    Summary of Changes to PTA Med List  New: Imodium  Discontinued: none  Changed: dicyclomine, multivitamin    Patient was asked about OTC/herbal products specifically.  PTA med list reflects this.    In the past week, patient estimated taking medication this percent of the time:  greater than 90%.    Allergies were reviewed, assessed, and updated with the patient.      Medications available for use during hospital stay: albuterol.     The information provided in this note is only as accurate as the sources available at the time of the update(s).    Thank you for the opportunity to participate in the care of this patient.    Lizzie Conner  10/22/2021 8:29 AM

## 2021-10-22 NOTE — PLAN OF CARE
Note from 0700-surgery time. Pt rated pain 0-10/10 during shift thus far. Dr. Morton aware that hernia did pop out again, one time dose of IV dilaudid given with relief per pt. Skilled monitoring in all medical conditions. No new skin issues noted. Full sensation per pt. SBA for transferring. Cares transferred around 1330 as pt was brought down for surgery. Saline locked at that time and pt voided prior to procedure. Education on medication administration and use of call-light to reduce risk for falls and injury. No further issues noted. VSS, no shortness of breath. Will continue to monitor.    Taylor R Schoenecker, RN

## 2021-10-22 NOTE — OP NOTE
Name:  Jose BRANNON Agustinaasad  PCP:  Trish Munoz  Procedure Date:  10/22/2021      OPEN LEFT INGUINAL HERNIA REPAIR WITH MESH      Pre-Procedure Diagnosis:  Incarcerated left inguinal hernia causing small bowel obstruction    Post-Procedure Diagnosis:    Incarcerated indirect inguinal hernia  Small bowel obstruction    Anesthesia Type:  General    Estimated Blood Loss:   5 cc    Specimens:    None    Complications:    None apparent    Indication for procedure:  This is a 56-year-old male who presented to the ED after he noticed pain and swelling in the left inguinal region.  He was found to have a left inguinal hernia.  It was thought that it was reduced so he was discharged home.  He then developed nausea and vomiting along with recurrence of his left inguinal hernia.  Repeat CT was obtained and it redemonstrated his incarcerated inguinal hernia.  On physical exam he has a soft hernia but is not reducible.  It was recommended that he undergo no hernia repair to treat the hernia as well as resolve the bowel obstruction.    Operative Narrative:    After informed consent was obtained, and the risks and benefits of the procedure were discussed, the patient was taken to the operating room and placed in a supine position.  General anesthesia was instituted by the anesthesia staff. Preoperative antibiotics were administered and a time-out was performed.  The patient was then prepped and draped in the usual sterile manner.  Local anesthetic of 0.5% Marcaine with epinephrine was infiltrated into the left inguinal region.  Incision made in the left inguinal region.  This was taken down through Ac's fascia to the external oblique fascia.  The external oblique aponeurosis was incised in the direction of its fibers and lengthened to the external ring.  Flaps were created in the external oblique aponeurosis.  The cord was circumscribed and isolated with a Penrose.  The ilioinguinal nerve was immediately identified.  It was  isolated and sacrificed to help minimize chronic postoperative pain.  The cord structures were evaluated and an indirect hernia sac was identified.  The hernia sac was dissected away from the cord down to the neck.  The hernia sac was opened and small bowel was within it.  A small knuckle was being choked off and was slightly purple but without evidence of necrosis.  As it was freed up, it started to pink up.  Ultimately the small bowel was reduced.  The hernia sac was ligated at the neck with 2-0 Vicryl and discarded.  Prolene mesh was obtained  It was placed along the floor of the canal parallel to the inguinal ligament.  It wrapped around the cord structures to re-create the internal ring.  The mesh was secured in place with 0 Ethibond to the pubic tubercle, conjoined tendon, and to the iliopubic tract.  The tails of the mesh were tucked under the external oblique fascia.  An ilioinguinal nerve block was performed.  The external oblique aponeurosis was then closed in a running fashion with 3-0 Vicryl.  Ac's fascia was closed with a running 3-0 Vicryl.  The incision was closed with interrupted 4-0 Vicryl, followed by a running subcuticular 4-0 Monocryl.  Steri-Strips and sterile dressings are applied.      Disposition:  All sponge and needle counts were correct.  The patient tolerated the procedure well and was transferred to the postanesthesia care unit in stable condition.  He will return to his room where he can advance his diet as tolerated.  Surgical discharge instructions placed for when he is ready for discharge.    Carola Browne DO  General Surgeon  [facility]  Surgery [address] [address]  Office: [phone]  Employed by - [facility]

## 2021-10-22 NOTE — PLAN OF CARE
Problem: Adult Inpatient Plan of Care  Goal: Plan of Care Review  Outcome: Improving     Problem: Adult Inpatient Plan of Care  Goal: Optimal Comfort and Wellbeing  Outcome: Improving     Pt came to floor from surgery approx 1800. Pt complained of mod 6/10 pain. Prn IV dilaudid given with good relief per pt.  VSS- WCTM  IVF @ 50ml/hr  Surgical dressing- CDI   none

## 2021-10-22 NOTE — CONSULTS
General Surgery Consultation  Jose Boyle MRN# [id number]   Age/Sex: 56 year old male YOB: 1965     Reason for consult: 1. Inguinal hernia of left side with obstruction            Requesting physician: Dr. De Los Santos                   Assessment and Plan:   Assessment:  Incarcerated left inguinal hernia    Plan:  Reviewed exam, lab, and imaging findings with him. Given the signs of obstruction and unable to manage at home, will plan for OR today. This is planned with Dr. Browne this afternoon. Continue NG to LIS and NPO until this time.        Avinash Hopkins PA-C  Pager - [phone]  Phone - [phone]   General Surgery           Chief Complaint:     Chief Complaint   Patient presents with     Nausea & Vomiting     Constipation        History is obtained from the patient    HPI:   Jose Boyle is a 56 year old male who presents to the ED x2 yesterday and early today for abdominal pain and n/v. He was initially seen and found to have a left inguinal hernia, but this was reduced and he was stable enough to discharge home from the ED. His pain and n/v worsened, prompting him to return. NG was placed and he was admitted for consideration of surgery. He says his n/v has resolved since NG was placed. Passing a little gas, but no BM.     No previous abdominal surgeries.           Past Medical History:     Past Medical History:   Diagnosis Date     Gastritis     alcoholism     Hiatal hernia      Irritable bowel syndrome      Kidney stones               Past Surgical History:     Past Surgical History:   Procedure Laterality Date     ANTERIOR CERVICAL DISCECTOMY W/ FUSION  2005    C4-5, C5-6     CLOSED REDUCTION, PERCUTANEOUS PINNING  HAND, COMBINED       IR CERVICAL EPIDURAL STEROID INJECTION  6/27/2005             Social History:    reports that he has been smoking cigarettes. He has a 35.00 pack-year smoking history. He has never used smokeless tobacco.           Family History:     Family History    Problem Relation Age of Onset     Cancer Mother      Multiple Sclerosis Mother      Heart Disease Father      Diabetes Father               Allergies:     Allergies   Allergen Reactions     Ibuprofen Nausea              Medications:     Prior to Admission medications    Medication Sig Start Date End Date Taking? Authorizing Provider   albuterol (PROAIR HFA/PROVENTIL HFA/VENTOLIN HFA) 108 (90 Base) MCG/ACT inhaler Inhale 1-2 puffs into the lungs every 4 hours as needed for shortness of breath / dyspnea or wheezing 10/13/21  Yes Trish Munoz MD   dicyclomine (BENTYL) 10 MG capsule Take 1 capsule by mouth 4 times daily (before meals and nightly)  3/6/20  Yes Provider, Historical   loperamide (IMODIUM) 2 MG capsule Take 2 mg by mouth 2 times daily as needed for diarrhea   Yes Unknown, Entered By History   MULTIVITAMIN ORAL Take 1 tablet by mouth every morning  3/10/15  Yes Provider, Historical   omeprazole (PRILOSEC) 20 MG DR capsule Take 1 capsule by mouth once daily 9/2/21  Yes Trish Munoz MD              Review of Systems:   The Review of Systems is negative other than noted in the HPI            Physical Exam:     Patient Vitals for the past 24 hrs:   BP Temp Temp src Pulse Resp SpO2 Weight   10/22/21 0758 (!) 153/82 98.1  F (36.7  C) Oral 66 16 97 % --   10/22/21 0611 129/81 98  F (36.7  C) Oral 65 17 96 % 57.2 kg (126 lb 3.2 oz)   10/22/21 0345 137/80 -- -- 81 -- 93 % --   10/22/21 0215 (!) 190/93 -- -- 66 -- 99 % --   10/22/21 0154 (!) 171/107 98  F (36.7  C) Oral 89 22 98 % 65 kg (143 lb 4.8 oz)          Intake/Output Summary (Last 24 hours) at 10/22/2021 0825  Last data filed at 10/22/2021 0611  Gross per 24 hour   Intake 1000 ml   Output --   Net 1000 ml      Constitutional:   awake, alert, cooperative, no apparent distress, and appears stated age       Eyes:   PERRL, conjunctiva/corneas clear, EOM's intact; no scleral edema or icterus noted        ENT:   Normocephalic, without obvious  abnormality, atraumatic, Lips, mucosa, and tongue normal        Hematologic / Lymphatic:   No lymphadenopathy       Lungs:   Normal respiratory effort, no accessory muscle use       Cardiovascular:   Regular rate and rhythm       Abdomen:   Soft, nondistended, left inguinal hernia present and is reducible, mild ttp, no rebound or guarding       Musculoskeletal:   No obvious swelling, bruising or deformity       Skin:   Skin color and texture normal for patient, no rashes or lesions              Data:        Admission on 10/22/2021   Component Date Value     Bilirubin Total 10/22/2021 1.8*     Bilirubin Direct 10/22/2021 0.5      Protein Total 10/22/2021 7.5      Albumin 10/22/2021 4.3      Alkaline Phosphatase 10/22/2021 82      AST 10/22/2021 21      ALT 10/22/2021 14      Lipase 10/22/2021 31      SARS CoV2 PCR 10/22/2021 Negative      Systolic Blood Pressure 10/22/2021 137      Diastolic Blood Pressure 10/22/2021 80      Ventricular Rate 10/22/2021 72      Atrial Rate 10/22/2021 72      ID Interval 10/22/2021 142      QRS Duration 10/22/2021 82      QT 10/22/2021 394      QTc 10/22/2021 431      P Axis 10/22/2021 79      R AXIS 10/22/2021 60      T Axis 10/22/2021 65      Interpretation ECG 10/22/2021                      Value:Sinus rhythm  Normal ECG  No previous ECGs available  Confirmed by SEE ED PROVIDER NOTE FOR, ECG INTERPRETATION (4000),  SIMBA NUR ([id number]) on 10/22/2021 6:55:18 AM       Lactic Acid 10/22/2021 0.9      Procalcitonin 10/22/2021 0.06      Potassium 10/22/2021 4.3      Magnesium 10/22/2021 1.9      WBC Count 10/22/2021 9.3      RBC Count 10/22/2021 4.30*     Hemoglobin 10/22/2021 13.4      Hematocrit 10/22/2021 39.0*     MCV 10/22/2021 91      MCH 10/22/2021 31.2      MCHC 10/22/2021 34.4      RDW 10/22/2021 14.1      Platelet Count 10/22/2021 193      % Neutrophils 10/22/2021 85      % Lymphocytes 10/22/2021 12      % Monocytes 10/22/2021 3      % Eosinophils 10/22/2021 0      %  Basophils 10/22/2021 0      % Immature Granulocytes 10/22/2021 0      NRBCs per 100 WBC 10/22/2021 0      Absolute Neutrophils 10/22/2021 7.9      Absolute Lymphocytes 10/22/2021 1.1      Absolute Monocytes 10/22/2021 0.3      Absolute Eosinophils 10/22/2021 0.0      Absolute Basophils 10/22/2021 0.0      Absolute Immature Granul* 10/22/2021 0.0      Absolute NRBCs 10/22/2021 0.0    Admission on 10/21/2021, Discharged on 10/21/2021   Component Date Value     Sodium 10/21/2021 140      Potassium 10/21/2021 4.3      Chloride 10/21/2021 105      Carbon Dioxide (CO2) 10/21/2021 21*     Anion Gap 10/21/2021 14      Urea Nitrogen 10/21/2021 11      Creatinine 10/21/2021 1.12      Calcium 10/21/2021 10.0      Glucose 10/21/2021 112      GFR Estimate 10/21/2021 73      Lipase 10/21/2021 33      Bilirubin Total 10/21/2021 1.2*     Bilirubin Direct 10/21/2021 0.3      Protein Total 10/21/2021 7.3      Albumin 10/21/2021 4.4      Alkaline Phosphatase 10/21/2021 76      AST 10/21/2021 20      ALT 10/21/2021 15      WBC Count 10/21/2021 9.3      RBC Count 10/21/2021 4.50      Hemoglobin 10/21/2021 14.0      Hematocrit 10/21/2021 41.0      MCV 10/21/2021 91      MCH 10/21/2021 31.1      MCHC 10/21/2021 34.1      RDW 10/21/2021 14.4      Platelet Count 10/21/2021 212      % Neutrophils 10/21/2021 79      % Lymphocytes 10/21/2021 16      % Monocytes 10/21/2021 5      % Eosinophils 10/21/2021 0      % Basophils 10/21/2021 0      % Immature Granulocytes 10/21/2021 0      NRBCs per 100 WBC 10/21/2021 0      Absolute Neutrophils 10/21/2021 7.3      Absolute Lymphocytes 10/21/2021 1.5      Absolute Monocytes 10/21/2021 0.4      Absolute Eosinophils 10/21/2021 0.0      Absolute Basophils 10/21/2021 0.0      Absolute Immature Granul* 10/21/2021 0.0      Absolute NRBCs 10/21/2021 0.0      Lactic Acid 10/21/2021 0.8          All imaging studies reviewed by me.

## 2021-10-22 NOTE — ANESTHESIA PREPROCEDURE EVALUATION
Anesthesia Pre-Procedure Evaluation    Patient: Jose Boyle   MRN: [id number] : 1965        Preoperative Diagnosis: Inguinal hernia of left side with obstruction [K40.30]    Procedure : Procedure(s):  HERNIORRHAPHY, INGUINAL, OPEN          Past Medical History:   Diagnosis Date     Gastritis     alcoholism     Hiatal hernia      Irritable bowel syndrome      Kidney stones       Past Surgical History:   Procedure Laterality Date     ANTERIOR CERVICAL DISCECTOMY W/ FUSION      C4-5, C5-6     CLOSED REDUCTION, PERCUTANEOUS PINNING  HAND, COMBINED       IR CERVICAL EPIDURAL STEROID INJECTION  2005      Allergies   Allergen Reactions     Ibuprofen Nausea      Social History     Tobacco Use     Smoking status: Current Every Day Smoker     Packs/day: 1.00     Years: 35.00     Pack years: 35.00     Types: Cigarettes     Smokeless tobacco: Never Used   Substance Use Topics     Alcohol use: Not on file      Wt Readings from Last 1 Encounters:   10/22/21 57.2 kg (126 lb 3.2 oz)        Anesthesia Evaluation            ROS/MED HX  ENT/Pulmonary:     (+) tobacco use, Current use,     Neurologic:  - neg neurologic ROS     Cardiovascular: Comment: 10/22/21:    Sinus rhythm   Normal ECG   No previous ECGs available   Confirmed by SEE ED PROVIDER NOTE FOR, ECG INTERPRETATION (4000),  SIMBA NUR ([id number]) on 10/22/2021 6:55:18 AM            (-) murmur and wheezes   METS/Exercise Tolerance: >4 METS    Hematologic:  - neg hematologic  ROS     Musculoskeletal: Comment: Hx of C spine surgery      GI/Hepatic:     (+) GERD, hiatal hernia, Inflammatory bowel disease,     Renal/Genitourinary:     (+) renal disease,     Endo:  - neg endo ROS     Psychiatric/Substance Use:  - neg psychiatric ROS     Infectious Disease:  - neg infectious disease ROS     Malignancy:  - neg malignancy ROS     Other:  - neg other ROS          Physical Exam    Airway  airway exam normal    Comment: Edentulous    Mallampati: II   TM  distance: > 3 FB   Neck ROM: full   Mouth opening: > 3 cm    Respiratory Devices and Support         Dental     Comment: Edentulous    (+) upper dentures and lower dentures      Cardiovascular          Rhythm and rate: regular and normal (-) no murmur    Pulmonary           breath sounds clear to auscultation   (-) no wheezes        OUTSIDE LABS:  CBC:   Lab Results   Component Value Date    WBC 9.3 10/22/2021    WBC 9.3 10/21/2021    HGB 13.4 10/22/2021    HGB 14.0 10/21/2021    HCT 39.0 (L) 10/22/2021    HCT 41.0 10/21/2021     10/22/2021     10/21/2021     BMP:   Lab Results   Component Value Date     10/21/2021     04/12/2018    POTASSIUM 4.3 10/22/2021    POTASSIUM 4.3 10/21/2021    CHLORIDE 105 10/21/2021    CHLORIDE 102 04/12/2018    CO2 21 (L) 10/21/2021    CO2 23 04/12/2018    BUN 11 10/21/2021    BUN 13 04/12/2018    CR 1.12 10/21/2021    CR 0.9 04/23/2018     10/21/2021    GLC 96 04/12/2018     COAGS: No results found for: PTT, INR, FIBR  POC: No results found for: BGM, HCG, HCGS  HEPATIC:   Lab Results   Component Value Date    ALBUMIN 4.3 10/22/2021    PROTTOTAL 7.5 10/22/2021    ALT 14 10/22/2021    AST 21 10/22/2021    ALKPHOS 82 10/22/2021    BILITOTAL 1.8 (H) 10/22/2021     OTHER:   Lab Results   Component Value Date    LACT 0.9 10/22/2021    DANGELO 10.0 10/21/2021    MAG 1.9 10/22/2021    LIPASE 31 10/22/2021       Anesthesia Plan    ASA Status:  3   NPO Status:  NPO Appropriate    Anesthesia Type: General.     - Airway: ETT   Induction: Intravenous, Propofol, RSI.   Maintenance: Inhalation.   Techniques and Equipment:     - Airway: Video-Laryngoscope         Consents    Anesthesia Plan(s) and associated risks, benefits, and realistic alternatives discussed. Questions answered and patient/representative(s) expressed understanding.     - Discussed with:  Patient      - Patient is DNR/DNI Status: No         Postoperative Care    Pain management: IV analgesics, Oral pain  medications.   PONV prophylaxis: Ondansetron (or other 5HT-3), Dexamethasone or Solumedrol     Comments:    General Anesthesia with an ETT for aspiration risk. Plan for RSI and glidescope.            Reno Lambert MD

## 2021-10-22 NOTE — PROGRESS NOTES
[facility] MEDICINE PROGRESS NOTE      Identification/Summary: Jose Boyle is a 56 year old male with a past medical history of COPD, ongoing tobacco use, GERD, came with inguinal and scrotal pain and swelling secondary to incarcerated left inguinal hernia with obstruction.  NG suction initiated with low intermittent suction.  Evaluated by general surgery.  Will have inguinal hernia repair today.    Assessment and Plan:  Incarcerated left inguinal hernia with obstruction  N.p.o.  NG suction  Will have surgical repair today    COPD without exacerbation  Ongoing tobacco use  Smoking cessation is advised    GERD  Resume PPI    Code full  DVT prophylaxis  No subcu Heparin as same day surgery  Barrier to discharge  Will have hernia repair today.  Anticipated discharge in 1 day    Interval History/Subjective:  Resting comfortably.  Left inguinal pain is stable at present.  Does lift weight at his work.  No nausea or vomiting at present.    Review of systems  Rest of the review of system are negative except HPI.  Denies headache, chest pain, palpitation, heartburn, diarrhea, constipation or urinary symptoms.    Physical Exam/Objective:  Temp:  [98  F (36.7  C)-98.6  F (37  C)] 98.6  F (37  C)  Pulse:  [63-89] 63  Resp:  [16-22] 16  BP: (112-190)/() 140/85  SpO2:  [93 %-99 %] 97 %  Body mass index is 19.19 kg/m .    GENERAL:  Alert, appears comfortable, in no acute distress, appears stated age   HEAD:  Normocephalic, without obvious abnormality, atraumatic   EYES:  PERRL, conjunctiva  clear,   EOM's intact   NOSE: Nares normal,  mucosa normal, no drainage   THROAT: Lips, mucosa,  gums normal, mouth moist   NECK: Supple, symmetrical, trachea midline   BACK:   Symmetric, no curvature, ROM normal   LUNGS:   Clear to auscultation bilaterally, no rales, rhonchi, or wheezing, symmetric chest rise on inhalation, respirations unlabored   CHEST WALL:  No tenderness or deformity   HEART:   Regular rate and rhythm, S1 and S2 normal, no murmur, rub, or gallop    ABDOMEN:   Soft, non-tender, bowel sounds active , left sided inguinal hernia present   EXTREMITIES: No  edema    SKIN: No rashes   NEURO: Alert, oriented x3, moves all four extremities freely   PSYCH: Cooperative, behavior is appropriate      Data reviewed today: I personally reviewed all new medications, labs, imaging/diagnostics reports over the past 24 hours. Pertinent findings include:    Imaging:   Recent Results (from the past 24 hour(s))   Abdomen XR, 2 vw, flat and upright    Narrative    EXAM: XR ABDOMEN 2VIEWS  LOCATION: [facility]  DATE/TIME: 10/22/2021 2:51 AM    INDICATION: Pain. Small bowel obstruction.  COMPARISON: 10/21/2021 CT abdomen pelvis      Impression    IMPRESSION: Dilated loops of small bowel and air-fluid levels consistent with small bowel obstruction. Contrast within the bladder. Lung bases clear.    CT Abdomen Pelvis w/o Contrast    Narrative    EXAM: CT ABDOMEN PELVIS W/O CONTRAST  LOCATION: [facility]  DATE/TIME: 10/22/2021 3:28 AM    INDICATION: Abdominal pain. Nausea and vomiting. Known left inguinal hernia.  COMPARISON: 10/21/2021.  TECHNIQUE: CT scan of the abdomen and pelvis was performed without IV contrast. Multiplanar reformats were obtained. Dose reduction techniques were used.  CONTRAST: None.    FINDINGS:   LOWER CHEST: Mild dependent atelectasis at the lung bases.    HEPATOBILIARY: Contrast material in the gallbladder from previous exam.    PANCREAS: Normal.    SPLEEN: Normal.    ADRENAL GLANDS: Normal.    KIDNEYS/BLADDER: Right renal cysts. No hydronephrosis. Contrast material in the bladder from previous exam.    BOWEL: A loop of small bowel is herniated into the left inguinal canal and is causing bowel obstruction. Distal small bowel and colon are nondilated. There are colonic diverticula without acute diverticulitis. There is a small amount of  ascites. No free   intraperitoneal gas.    LYMPH NODES: Normal.    VASCULATURE: Atherosclerotic calcification of the aorta and its branches. No aneurysm.    PELVIC ORGANS: Normal.    MUSCULOSKELETAL: Degenerative disease in the spine. Left inguinal hernia containing small bowel.      Impression    IMPRESSION:   1.  Distal small bowel obstruction caused by a short segment of small bowel within a left inguinal hernia.  2.  Small amount of ascites.     XR Chest Port 1 View    Narrative    EXAM: XR CHEST PORT 1 VIEW  LOCATION: [facility]  DATE/TIME: 10/22/2021 5:40 AM    INDICATION: preop  COMPARISON: 10/13/2021      Impression    IMPRESSION: Enteric tube tip in stomach. Side port distal esophagus and could be advanced. No focal consolidation or pleural effusion. Postsurgical change cervical spine. Prominent bowel visualized upper abdomen.       Labs:  Most Recent 3 CBC's:Recent Labs   Lab Test 10/22/21  0554 10/21/21  1017 04/12/18  1124   WBC 9.3 9.3 4.0   HGB 13.4 14.0 15.1   MCV 91 91 92    212 156     Most Recent 3 BMP's:Recent Labs   Lab Test 10/22/21  0554 10/21/21  1017 04/23/18  0744 04/12/18  1124   NA  --  140  --  136   POTASSIUM 4.3 4.3  --  4.7   CHLORIDE  --  105  --  102   CO2  --  21*  --  23   BUN  --  11  --  13   CR  --  1.12 0.9 1.24   ANIONGAP  --  14  --  11   DANGELO  --  10.0  --  9.6   GLC  --  112  --  96       Medications:   Personally Reviewed.  Medications     sodium chloride 100 mL/hr at 10/22/21 0611       pantoprazole  40 mg Oral QAM AC     sodium chloride (PF)  3 mL Intracatheter Q8H       Shana Morton MD  Hospitalist  [facility]  [facility]  Phone: #[phone]

## 2021-10-23 VITALS
TEMPERATURE: 98.2 F | DIASTOLIC BLOOD PRESSURE: 78 MMHG | WEIGHT: 126.2 LBS | RESPIRATION RATE: 18 BRPM | BODY MASS INDEX: 19.19 KG/M2 | OXYGEN SATURATION: 94 % | SYSTOLIC BLOOD PRESSURE: 132 MMHG | HEART RATE: 79 BPM

## 2021-10-23 LAB
MAGNESIUM SERPL-MCNC: 2.2 MG/DL (ref 1.8–2.6)
POTASSIUM BLD-SCNC: 4.3 MMOL/L (ref 3.5–5)

## 2021-10-23 PROCEDURE — 84132 ASSAY OF SERUM POTASSIUM: CPT | Performed by: SURGERY

## 2021-10-23 PROCEDURE — 36415 COLL VENOUS BLD VENIPUNCTURE: CPT | Performed by: SURGERY

## 2021-10-23 PROCEDURE — 99239 HOSP IP/OBS DSCHRG MGMT >30: CPT | Performed by: FAMILY MEDICINE

## 2021-10-23 PROCEDURE — 83735 ASSAY OF MAGNESIUM: CPT | Performed by: SURGERY

## 2021-10-23 PROCEDURE — 250N000013 HC RX MED GY IP 250 OP 250 PS 637: Performed by: SURGERY

## 2021-10-23 PROCEDURE — 99024 POSTOP FOLLOW-UP VISIT: CPT | Performed by: PHYSICIAN ASSISTANT

## 2021-10-23 PROCEDURE — 250N000013 HC RX MED GY IP 250 OP 250 PS 637: Performed by: FAMILY MEDICINE

## 2021-10-23 PROCEDURE — 250N000011 HC RX IP 250 OP 636: Performed by: SURGERY

## 2021-10-23 PROCEDURE — 258N000003 HC RX IP 258 OP 636: Performed by: SURGERY

## 2021-10-23 RX ORDER — OXYCODONE HYDROCHLORIDE 5 MG/1
5-10 TABLET ORAL EVERY 4 HOURS PRN
Qty: 15 TABLET | Refills: 0 | Status: SHIPPED | OUTPATIENT
Start: 2021-10-23 | End: 2021-11-01

## 2021-10-23 RX ORDER — DICYCLOMINE HYDROCHLORIDE 10 MG/1
10 CAPSULE ORAL
COMMUNITY
Start: 2021-10-23 | End: 2023-07-26

## 2021-10-23 RX ORDER — ACETAMINOPHEN 325 MG/1
650 TABLET ORAL EVERY 6 HOURS PRN
COMMUNITY
Start: 2021-10-23

## 2021-10-23 RX ORDER — SIMETHICONE 80 MG
80 TABLET,CHEWABLE ORAL 4 TIMES DAILY
Status: DISCONTINUED | OUTPATIENT
Start: 2021-10-23 | End: 2021-10-23 | Stop reason: HOSPADM

## 2021-10-23 RX ADMIN — ALBUTEROL SULFATE 2 PUFF: 90 AEROSOL, METERED RESPIRATORY (INHALATION) at 05:20

## 2021-10-23 RX ADMIN — SIMETHICONE 80 MG: 80 TABLET, CHEWABLE ORAL at 09:10

## 2021-10-23 RX ADMIN — ALBUTEROL SULFATE 2 PUFF: 90 AEROSOL, METERED RESPIRATORY (INHALATION) at 00:56

## 2021-10-23 RX ADMIN — ACETAMINOPHEN 650 MG: 325 TABLET ORAL at 09:10

## 2021-10-23 RX ADMIN — OXYCODONE HYDROCHLORIDE 5 MG: 5 TABLET ORAL at 07:55

## 2021-10-23 RX ADMIN — SODIUM CHLORIDE: 9 INJECTION, SOLUTION INTRAVENOUS at 00:23

## 2021-10-23 RX ADMIN — HYDROMORPHONE HYDROCHLORIDE 0.3 MG: 1 INJECTION, SOLUTION INTRAMUSCULAR; INTRAVENOUS; SUBCUTANEOUS at 00:22

## 2021-10-23 RX ADMIN — PANTOPRAZOLE SODIUM 40 MG: 20 TABLET, DELAYED RELEASE ORAL at 05:18

## 2021-10-23 RX ADMIN — HYDROMORPHONE HYDROCHLORIDE 0.3 MG: 1 INJECTION, SOLUTION INTRAMUSCULAR; INTRAVENOUS; SUBCUTANEOUS at 04:23

## 2021-10-23 ASSESSMENT — ACTIVITIES OF DAILY LIVING (ADL)
ADLS_ACUITY_SCORE: 8

## 2021-10-23 NOTE — PROGRESS NOTES
Care Management Discharge Note    Discharge Date: 10/23/2021       Discharge Disposition:  Home     Additional Information:  Reviewed at discharge. No CM needs noted for discharge. Pt to return home as needed.     10:33 AM    BPA triggered at discharge. CM sent referral as requested.     EDIS Mills

## 2021-10-23 NOTE — PROGRESS NOTES
ASSESSMENT:  1. Inguinal hernia of left side with obstruction        Jose Boyle is a 56 year old male who is s/p left inguinal hernia repair with incarcerated small bowel on 10/22/2021 POD 1    PLAN:  -Doing well so far.  Okay for discharge for today.  Our surgical recommendations have been placed.  We will have our clinic contact him for postop follow-up in 2 weeks.    SUBJECTIVE:   He is overall feeling a little better but still feels pain on the left inguinal canal that worsens with his cough.  Continues to cough.  No shortness of breath.  No fevers.  Ambulating.  Urinating on own.  Has tolerated his food so far and passing gas.      Patient Vitals for the past 24 hrs:   BP Temp Temp src Pulse Resp SpO2   10/23/21 0731 132/78 98.2  F (36.8  C) Oral 79 18 94 %   10/23/21 0355 137/80 98.6  F (37  C) Oral 82 16 93 %   10/23/21 0000 130/80 98  F (36.7  C) Oral 82 16 96 %   10/22/21 2100 133/74 98.4  F (36.9  C) Oral 91 18 95 %   10/22/21 2000 136/76 98.7  F (37.1  C) Axillary 81 18 93 %   10/22/21 1900 (!) 151/84 98.4  F (36.9  C) Axillary 97 18 94 %   10/22/21 1830 (!) 149/86 98.6  F (37  C) Axillary 87 18 90 %   10/22/21 1800 (!) 150/85 98.3  F (36.8  C) Axillary 90 18 94 %   10/22/21 1740 (!) 144/80 -- -- 99 17 93 %   10/22/21 1732 -- 99.1  F (37.3  C) Temporal -- -- --   10/22/21 1730 139/78 -- -- 98 19 96 %   10/22/21 1728 -- -- -- -- 24 98 %   10/22/21 1725 (!) 156/86 -- -- 102 17 100 %   10/22/21 1719 (!) 161/93 98  F (36.7  C) -- 108 26 100 %   10/22/21 1330 (!) 142/75 97.7  F (36.5  C) Axillary 101 18 95 %   10/22/21 1133 (!) 140/85 98.6  F (37  C) Oral 63 16 97 %         PHYSICAL EXAM:  GEN: No acute distress, comfortable    ABD: Soft tender left lower quadrant minimal.  Incision is dry and covered with gauze and Tegaderm    Output by Drain (mL) 10/21/21 0700 - 10/21/21 1459 10/21/21 1500 - 10/21/21 2259 10/21/21 2300 - 10/22/21 0659 10/22/21 0700 - 10/22/21 1459 10/22/21 1500 - 10/22/21 2259 10/22/21  2300 - 10/23/21 0659 10/23/21 0700 - 10/23/21 0903   Patient has no LDAs of requested type attached.      EXT:No cyanosis, edema or obvious abnormalities    10/22 0700 - 10/23 0659  In: 2470 [I.V.:2318]  Out: 955 [Urine:950]    Admission on 10/22/2021   Component Date Value     Bilirubin Total 10/22/2021 1.8*     Bilirubin Direct 10/22/2021 0.5      Protein Total 10/22/2021 7.5      Albumin 10/22/2021 4.3      Alkaline Phosphatase 10/22/2021 82      AST 10/22/2021 21      ALT 10/22/2021 14      Lipase 10/22/2021 31      SARS CoV2 PCR 10/22/2021 Negative      Systolic Blood Pressure 10/22/2021 137      Diastolic Blood Pressure 10/22/2021 80      Ventricular Rate 10/22/2021 72      Atrial Rate 10/22/2021 72      MO Interval 10/22/2021 142      QRS Duration 10/22/2021 82      QT 10/22/2021 394      QTc 10/22/2021 431      P Axis 10/22/2021 79      R AXIS 10/22/2021 60      T Axis 10/22/2021 65      Interpretation ECG 10/22/2021                      Value:Sinus rhythm  Normal ECG  No previous ECGs available  Confirmed by SEE ED PROVIDER NOTE FOR, ECG INTERPRETATION (4000),  SIMBA NUR ([id number]) on 10/22/2021 6:55:18 AM       Lactic Acid 10/22/2021 0.9      Procalcitonin 10/22/2021 0.06      Potassium 10/22/2021 4.3      Magnesium 10/22/2021 1.9      WBC Count 10/22/2021 9.3      RBC Count 10/22/2021 4.30*     Hemoglobin 10/22/2021 13.4      Hematocrit 10/22/2021 39.0*     MCV 10/22/2021 91      MCH 10/22/2021 31.2      MCHC 10/22/2021 34.4      RDW 10/22/2021 14.1      Platelet Count 10/22/2021 193      % Neutrophils 10/22/2021 85      % Lymphocytes 10/22/2021 12      % Monocytes 10/22/2021 3      % Eosinophils 10/22/2021 0      % Basophils 10/22/2021 0      % Immature Granulocytes 10/22/2021 0      NRBCs per 100 WBC 10/22/2021 0      Absolute Neutrophils 10/22/2021 7.9      Absolute Lymphocytes 10/22/2021 1.1      Absolute Monocytes 10/22/2021 0.3      Absolute Eosinophils 10/22/2021 0.0      Absolute Basophils  10/22/2021 0.0      Absolute Immature Granul* 10/22/2021 0.0      Absolute NRBCs 10/22/2021 0.0      Potassium 10/23/2021 4.3      Magnesium 10/23/2021 2.2           Flakita Shea PA-C

## 2021-10-23 NOTE — DISCHARGE SUMMARY
[facility] MEDICINE  DISCHARGE SUMMARY     Primary Care Physician: Trish Munoz  Admission Date: 10/22/2021   Discharge Provider: Shana Morton MD Discharge Date: 10/23/2021   Diet:   Regular diet   Code Status: Full Code   Activity: DCACTIVITY: Activity as tolerated        Condition at Discharge: Stable     REASON FOR PRESENTATION(See Admission Note for Details)   Left sided inguinal pain, swelling    PRINCIPAL & ACTIVE DISCHARGE DIAGNOSES     Incarcerated left inguinal hernia with obstruction    PENDING LABS     N/A    PROCEDURES ( this hospitalization only)      Procedure(s):  HERNIORRHAPHY, INGUINAL, OPEN, LEFT    RECOMMENDATIONS TO OUTPATIENT PROVIDER FOR F/U VISIT     Follow-up with primary MD in 10-day  Follow-up with general surgery in 10-day  DISPOSITION     Home    SUMMARY OF HOSPITAL COURSE:      Jose Boyle is a 56 year old male with a past medical history of COPD, ongoing tobacco use, GERD, came with left inguinal and scrotal pain and swelling secondary to incarcerated left inguinal hernia with obstruction.  Required NG suction prior to surgery.  He tolerated the procedure well.  He continues to have left inguinal pain especially with coughing.  Tolerating p.o. well.  Discharging home in stable condition.  Aloe up with general surgery in 10-day.  Continue local wound care. Routine discharge instruction provided.  No lifting more than 15 pounds for 4 weeks or no strenuous physical activity for 4 weeks.  Smoking cessation is advised.      Discharge Medications with Med changes:     Current Discharge Medication List      START taking these medications    Details   acetaminophen (TYLENOL) 325 MG tablet Take 2 tablets (650 mg) by mouth every 6 hours as needed for mild pain or other (and adjunct with moderate or severe pain or per patient request)         CONTINUE these medications which have CHANGED    Details   dicyclomine (BENTYL) 10 MG capsule Take 1  capsule (10 mg) by mouth 4 times daily (before meals and nightly) To be started in 10 day         CONTINUE these medications which have NOT CHANGED    Details   albuterol (PROAIR HFA/PROVENTIL HFA/VENTOLIN HFA) 108 (90 Base) MCG/ACT inhaler Inhale 1-2 puffs into the lungs every 4 hours as needed for shortness of breath / dyspnea or wheezing  Qty: 8 g, Refills: 1    Comments: Pharmacy may dispense brand covered by insurance (Proair, or proventil or ventolin or generic albuterol inhaler)  Associated Diagnoses: Cough      MULTIVITAMIN ORAL Take 1 tablet by mouth every morning       omeprazole (PRILOSEC) 20 MG DR capsule Take 1 capsule by mouth once daily  Qty: 90 capsule, Refills: 0    Associated Diagnoses: Epigastric pain         STOP taking these medications       loperamide (IMODIUM) 2 MG capsule Comments:   Reason for Stopping:                     Rationale for medication changes:      None        Consults       SURGERY GENERAL IP CONSULT    Immunizations given this encounter     Most Recent Immunizations   Administered Date(s) Administered     DT (PEDS <7y) 01/01/2000     FLU 6-35 months 01/18/2010     Flu, Unspecified 12/12/2016     Influenza (H1N1) 01/18/2010     Influenza (IIV3) PF 11/14/2011     Influenza Quad, Recombinant, pf(RIV4) (Flublok) 11/21/2019     Influenza Vaccine IM > 6 months Valent IIV4 (Alfuria,Fluzone) 11/30/2017     Influenza Vaccine, 6+MO IM (QUADRIVALENT W/PRESERVATIVES) 12/12/2016, 12/12/2016     Pneumococcal 23 valent 11/14/2011     Td (Adult), Adsorbed 01/01/2000     Td,adult,historic,unspecified 01/01/2000     Tdap (Adacel,Boostrix) 11/02/2015           Anticoagulation Information      N/A      SIGNIFICANT IMAGING FINDINGS     Results for orders placed or performed during the hospital encounter of 10/22/21   Abdomen XR, 2 vw, flat and upright    Impression    IMPRESSION: Dilated loops of small bowel and air-fluid levels consistent with small bowel obstruction. Contrast within the  bladder. Lung bases clear.    CT Abdomen Pelvis w/o Contrast    Impression    IMPRESSION:   1.  Distal small bowel obstruction caused by a short segment of small bowel within a left inguinal hernia.  2.  Small amount of ascites.     XR Chest Port 1 View    Impression    IMPRESSION: Enteric tube tip in stomach. Side port distal esophagus and could be advanced. No focal consolidation or pleural effusion. Postsurgical change cervical spine. Prominent bowel visualized upper abdomen.       SIGNIFICANT LABORATORY FINDINGS     Hgb 13.4    Discharge Orders        Reason for your hospital stay    Left inguinal pain     Follow-up and recommended labs and tests     Follow-up with primary MD in 10-dayFollow-up with general surgery in 7 to 10 days     Activity    Your activity upon discharge: activity as tolerated     Diet    Follow this diet upon discharge: regular diet       Examination   Physical Exam   Temp:  [97.7  F (36.5  C)-99.1  F (37.3  C)] 98.2  F (36.8  C)  Pulse:  [] 79  Resp:  [16-26] 18  BP: (130-161)/(74-93) 132/78  SpO2:  [90 %-100 %] 94 %  Wt Readings from Last 1 Encounters:   10/22/21 57.2 kg (126 lb 3.2 oz)     GENERAL:  Alert, appears comfortable, in no acute distress, appears stated age   HEAD:  Normocephalic, without obvious abnormality, atraumatic   EYES:  PERRL, conjunctiva  clear,   EOM's intact   NOSE: Nares normal,  mucosa normal, no drainage   THROAT: Lips, mucosa,  gums normal, mouth moist   NECK: Supple, symmetrical, trachea midline   BACK:   Symmetric, no curvature, ROM normal   LUNGS:   Clear to auscultation bilaterally, no rales, rhonchi, or wheezing, symmetric chest rise on inhalation, respirations unlabored   CHEST WALL:  No tenderness or deformity   HEART:  Regular rate and rhythm, S1 and S2 normal, no murmur, rub, or gallop    ABDOMEN:   Soft, mild tenderness at left inguinal area, bowel sounds active ,S/P left sided inguinal hernia present   EXTREMITIES: No  edema    SKIN: No rashes    NEURO: Alert, oriented x3, moves all four extremities freely   PSYCH: Cooperative, behavior is appropriate            Please see EMR for more detailed significant labs, imaging, consultant notes etc.    IShana MD, personally saw the patient today and spent greater than 30 minutes discharging this patient.    Shana Morton MD  [facility]    CC:Trish Munoz

## 2021-10-23 NOTE — PLAN OF CARE
Note from 0700 to discharge. Discharge paperwork addressed thoroughly with pt. Questions answered and addressed at length by writer. AVS sent with pt. IV access discontinued. No issues noted. VSS, no shortness of breath.    Taylor R Schoenecker, RN

## 2021-10-23 NOTE — PLAN OF CARE
"A/O x4, able to make needs known. VSS on room air. Dilaudid IV 0.3 mg given x2 this shift for 7/10 pain. Patient states PO oxy made his feel \"bloated and nauseous.\" IV running NS @50. On K and Mg protocol. Dressing to hernia repair site CDI.   "

## 2021-10-23 NOTE — PLAN OF CARE
Pt is vitally stable dorian this time and calls appropriately and makes needs known. Surgical dressing DCI and reports pain of 7/10 and prn Oxycodone administered. Voiding adequately and NS infusing at 50ml/hr. Will continue to monitor.

## 2021-10-23 NOTE — PROVIDER NOTIFICATION
Dr. Morton text paged at [phone].    #[phone], Rhina, Room 364, Pt requesting medication such as mylicon to help with gas pains. Thanks!    Pending response.    Taylor R Schoenecker, RN

## 2021-10-25 ENCOUNTER — PATIENT OUTREACH (OUTPATIENT)
Dept: NURSING | Facility: CLINIC | Age: 56
End: 2021-10-25

## 2021-10-25 NOTE — PROGRESS NOTES
Clinic Care Coordination Contact  Community Health Worker Initial Outreach            Patient accepts CC: No, Doing well at home on his own.. Patient will be sent Care Coordination introduction letter for future reference.     [facility]: Post-Discharge Note  SITUATION                                                      Admission:    Admission Date: 10/22/21   Reason for Admission: Left sided inguinal pain  Discharge:   Discharge Date: 10/23/21  Discharge Diagnosis: Inguinal hernia    BACKGROUND                                                      Jose Boyle is a 56 year old male with a past medical history of COPD, ongoing tobacco use, GERD, came with left inguinal and scrotal pain and swelling secondary to incarcerated left inguinal hernia with obstruction.  Required NG suction prior to surgery.  He tolerated the procedure well.  He continues to have left inguinal pain especially with coughing.  Tolerating p.o. well.  Discharging home in stable condition.  Aloe up with general surgery in 10-day.  Continue local wound care. Routine discharge instruction provided.  No lifting more than 15 pounds for 4 weeks or no strenuous physical activity for 4 weeks.  Smoking cessation is advised.       ASSESSMENT      Enrollment  Primary Care Care Coordination Status: Potential    Discharge Assessment  How are you doing now that you are home?: Feeling better-has chronic gastritis, having some GI discomfort  How are your symptoms? (Red Flag symptoms escalate to triage hotline per guidelines): Improved  Do you feel your condition is stable enough to be safe at home until your provider visit?: Yes  Does the patient have their discharge instructions? : Yes  Does the patient have questions regarding their discharge instructions? : No  Were you started on any new medications or were there changes to any of your previous medications? : Yes  Does the patient have all of their medications?: Yes  Do you have questions  regarding any of your medications? : No  Do you have all of your needed medical supplies or equipment (DME)?  (i.e. oxygen tank, CPAP, cane, etc.): Yes  Discharge follow-up appointment scheduled within 14 calendar days? : Yes  Discharge Follow Up Appointment Date: 11/04/21  Discharge Follow Up Appointment Scheduled with?: Specialty Care Provider    Post-op ([facility] Only)  If the patient had a surgery or procedure, do they have any questions for a nurse?: No    Post-op (Clinicians Only)  Did the patient have surgery or a procedure: Yes  Incision: healing  Drainage: No  Bleeding: none  Fever: No  Chills: No  Redness: No  Warmth: No  Swelling: No  Incision site pain: No  Closure: other  Eating & Drinking: eating and drinking without complaints/concerns (Decreased appetite)  PO Intake: regular diet  Additional Symptoms: decreased appetite  Bowel Function: constipation  Date of last BM: 10/22/21  Urinary Status: voiding without complaint/concerns        PLAN                                                      Outpatient Plan:      Reason for your hospital stay     Left inguinal pain          Follow-up and recommended labs and tests      Follow-up with primary MD in 10-dayFollow-up with general surgery in 7 to 10 days          Activity     Your activity upon discharge: activity as tolerated          Diet     Follow this diet upon discharge: regular diet           Future Appointments   Date Time Provider Department Center   11/8/2021  2:00 PM Day, LENA [facility]         For any urgent concerns, please contact our 24 hour nurse triage line: [phone] ([phone])         Heidi Glover

## 2021-10-29 NOTE — PROGRESS NOTES
Assessment/Plan:     Cough  Most likely this is a post viral syndrome, certainly possible he has underlying lung disease in the setting of ongoing tobacco use.  I suspect maybe symptoms are related to this.  We will do trial of albuterol inhaler.  No suggestive of underlying infection.  Chest x-ray without suggestion of complication.  Notify with onset additional symptoms, worsening, or failure to improve.  - XR Chest 2 Views; Future  - albuterol (PROAIR HFA/PROVENTIL HFA/VENTOLIN HFA) 108 (90 Base) MCG/ACT inhaler; Inhale 1-2 puffs into the lungs every 4 hours as needed for shortness of breath / dyspnea or wheezing    Tobacco user  Advised cessation.  Likely contributing to the above.  He declines assistance.    Chronic diarrhea  Encouraged him to schedule follow-up visit with gastroenterology 60 discussed exocrine pancreatic insufficiency with them.  We discussed available over-the-counter pancreatic as a support that could certainly try in the interim.  Advised that he continue combination of omeprazole, dicyclomine, and Imodium as prescribed.  Advised ongoing gluten avoidance.      There are no Patient Instructions on file for this visit.     No follow-ups on file.      Subjective:      Jose Boyle is a 56 year old male presented to clinic today to discuss a persistent cough.  Onset of cough October 3.  Evaluated on September 8, Covid test negative, chest x-ray is without pneumonia, treated with azithromycin for bronchitis.  Continues to have a cough.  Phlegm initially clear but now more opaque but nonpurulent.  Occasionally will feel short of breath especially with physical activity, no chest pain.  Some runny nose but no sinus pressure.  Feels like he has a constant rattle in his chest to the extent that he feels better sleeping sitting up.  Notes his significant other had similar symptoms, her onset was about 2 weeks after his, cough seems to be improving.  He is concerned that he could have pneumonia  or other underlying condition.    Describes chronic diarrhea that is leading to ongoing weight loss difficulty gaining weight.  Was diagnosed with celiac disease as a child.  Has been treating this with omeprazole, dicyclomine, and Imodium under the direction of gastroenterology.  Has been reading about exocrine pancreatic insufficiency and is wondering if this is contributing to his symptoms.    Current Outpatient Medications   Medication Sig Dispense Refill     albuterol (PROAIR HFA/PROVENTIL HFA/VENTOLIN HFA) 108 (90 Base) MCG/ACT inhaler Inhale 1-2 puffs into the lungs every 4 hours as needed for shortness of breath / dyspnea or wheezing 8 g 1     MULTIVITAMIN ORAL Take 1 tablet by mouth every morning        omeprazole (PRILOSEC) 20 MG DR capsule Take 1 capsule by mouth once daily 90 capsule 0     acetaminophen (TYLENOL) 325 MG tablet Take 2 tablets (650 mg) by mouth every 6 hours as needed for mild pain or other (and adjunct with moderate or severe pain or per patient request)       dicyclomine (BENTYL) 10 MG capsule Take 1 capsule (10 mg) by mouth 4 times daily (before meals and nightly) To be started in 10 day       oxyCODONE (ROXICODONE) 5 MG tablet Take 1-2 tablets (5-10 mg) by mouth every 4 hours as needed for moderate to severe pain or severe pain 15 tablet 0       Past Medical History, Family History, and Social History reviewed.  Past Medical History:   Diagnosis Date     Gastritis     alcoholism     Hiatal hernia      Irritable bowel syndrome      Kidney stones      Past Surgical History:   Procedure Laterality Date     ANTERIOR CERVICAL DISCECTOMY W/ FUSION  2005    C4-5, C5-6     CLOSED REDUCTION, PERCUTANEOUS PINNING  HAND, COMBINED       HERNIORRHAPHY INGUINAL Left 10/22/2021    Procedure: HERNIORRHAPHY, INGUINAL, OPEN, LEFT;  Surgeon: Carola Browne DO;  Location: [facility] Main OR     IR CERVICAL EPIDURAL STEROID INJECTION  6/27/2005     Ibuprofen  Family History   Problem Relation Age of Onset  "    Cancer Mother      Multiple Sclerosis Mother      Heart Disease Father      Diabetes Father      Social History     Socioeconomic History     Marital status:      Spouse name: Not on file     Number of children: Not on file     Years of education: Not on file     Highest education level: Not on file   Occupational History     Not on file   Tobacco Use     Smoking status: Current Every Day Smoker     Packs/day: 1.00     Years: 35.00     Pack years: 35.00     Types: Cigarettes     Smokeless tobacco: Never Used   Substance and Sexual Activity     Alcohol use: Not on file     Drug use: Not on file     Sexual activity: Not on file   Other Topics Concern     Not on file   Social History Narrative     Not on file     Social Determinants of Health     Financial Resource Strain:      Difficulty of Paying Living Expenses:    Food Insecurity:      Worried About Running Out of Food in the Last Year:      Ran Out of Food in the Last Year:    Transportation Needs:      Lack of Transportation (Medical):      Lack of Transportation (Non-Medical):    Physical Activity:      Days of Exercise per Week:      Minutes of Exercise per Session:    Stress:      Feeling of Stress :    Social Connections:      Frequency of Communication with Friends and Family:      Frequency of Social Gatherings with Friends and Family:      Attends [employer] Services:      Active Member of Clubs or Organizations:      Attends Club or Organization Meetings:      Marital Status:    Intimate Partner Violence:      Fear of Current or Ex-Partner:      Emotionally Abused:      Physically Abused:      Sexually Abused:          Review of systems is as stated in HPI, and the remainder of the 10 system review is otherwise negative.    Objective:     Vitals:    10/13/21 1354   Pulse: 97   Resp: 20   Temp: 98.5  F (36.9  C)   TempSrc: Oral   SpO2: 99%   Weight: 63.4 kg (139 lb 12.8 oz)   Height: 1.727 m (5' 8\")    Body mass index is 21.26 kg/m .    Alert " male in no acute distress.  Able to speak in full sentences without respiratory distress, cough, or wheeze.  Tympanic membrane's pearly translucent.  Oropharynx clear.  No sinus tenderness.  Neck supple with adenopathy.  Heart regular rate and rhythm.  Lungs with intermittent scattered expiratory wheeze, no rhonchi and with good aeration.  Extremities without edema.    I ordered and personally reviewed a 2 view chest x-ray which reveals no focal infiltrate or mass.  No significant changes from x-ray performed 9/8/2021.      This note has been dictated using voice recognition software. Any grammatical or context distortions are unintentional and inherent to the the software.

## 2021-11-01 ENCOUNTER — OFFICE VISIT (OUTPATIENT)
Dept: SURGERY | Facility: CLINIC | Age: 56
End: 2021-11-01
Payer: COMMERCIAL

## 2021-11-01 DIAGNOSIS — Z48.89 ENCOUNTER FOR POSTOPERATIVE CARE: Primary | ICD-10-CM

## 2021-11-01 PROCEDURE — 99024 POSTOP FOLLOW-UP VISIT: CPT | Performed by: PHYSICIAN ASSISTANT

## 2021-11-01 NOTE — PROGRESS NOTES
HPI: Pt is here for follow up of a left inguinal hernia repair done on 10/20/2022 with Dr. Browne for incarcerated hernia.  Patient was having a difficult time with bowel movements and had no bowel movements for several days after surgery and then after that he had small stools.  He was developing gastritis symptoms with pain and burning in his upper abdomen radiating right to his back.  He did eventually start stool softeners and today he states he feels much better he has had regular bowel movements and has no pain.  No questions or concerns.      There were no vitals taken for this visit.    EXAM:  GENERAL:Appears well  ABDOMEN:  Soft, +BS  SURGICAL WOUNDS:  Incisions healing well, no enduration or drainage.      Assessment/Plan: . Doing well after surgery and should follow up as needed.  Flakita Shea PA-C PA-C  [facility] Department of Surgery

## 2021-11-01 NOTE — LETTER
11/1/2021         RE: Jose Boyle  [address]  [place] [facility] [address]        Dear Colleague,    Thank you for referring your patient, Jose Boyle, to the [facility] [place]. Please see a copy of my visit note below.    HPI: Pt is here for follow up of a left inguinal hernia repair done on 10/20/2022 with Dr. Browne for incarcerated hernia.  Patient was having a difficult time with bowel movements and had no bowel movements for several days after surgery and then after that he had small stools.  He was developing gastritis symptoms with pain and burning in his upper abdomen radiating right to his back.  He did eventually start stool softeners and today he states he feels much better he has had regular bowel movements and has no pain.  No questions or concerns.      There were no vitals taken for this visit.    EXAM:  GENERAL:Appears well  ABDOMEN:  Soft, +BS  SURGICAL WOUNDS:  Incisions healing well, no enduration or drainage.      Assessment/Plan: . Doing well after surgery and should follow up as needed.  Flakita Shea PA-C PA-C  [facility] Department of Surgery          Again, thank you for allowing me to participate in the care of your patient.        Sincerely,        Flkaita Shea PA-C

## 2021-11-15 DIAGNOSIS — R05.9 COUGH: Primary | ICD-10-CM

## 2021-11-15 RX ORDER — FLUTICASONE PROPIONATE 220 UG/1
1 AEROSOL, METERED RESPIRATORY (INHALATION) 2 TIMES DAILY
Qty: 12 G | Refills: 1 | Status: SHIPPED | OUTPATIENT
Start: 2021-11-15 | End: 2022-05-25

## 2021-12-01 DIAGNOSIS — R10.13 EPIGASTRIC PAIN: ICD-10-CM

## 2021-12-03 NOTE — TELEPHONE ENCOUNTER
"Last Written Prescription Date:  9/2/21  Last Fill Quantity: 90,  # refills: 0   Last office visit provider:  10/13/21     Requested Prescriptions   Pending Prescriptions Disp Refills     omeprazole (PRILOSEC) 20 MG DR capsule [Pharmacy Med Name: Omeprazole 20 MG Oral Capsule Delayed Release] 90 capsule 0     Sig: Take 1 capsule by mouth once daily       PPI Protocol Passed - 12/1/2021  6:24 PM        Passed - Not on Clopidogrel (unless Pantoprazole ordered)        Passed - No diagnosis of osteoporosis on record        Passed - Recent (12 mo) or future (30 days) visit within the authorizing provider's specialty     Patient has had an office visit with the authorizing provider or a provider within the authorizing providers department within the previous 12 mos or has a future within next 30 days. See \"Patient Info\" tab in inbasket, or \"Choose Columns\" in Meds & Orders section of the refill encounter.              Passed - Medication is active on med list        Passed - Patient is age 18 or older             Bret Tovar RN 12/03/21 2:39 PM  "

## 2022-05-01 NOTE — DISCHARGE INSTRUCTIONS
From your Surgeon:  Follow up:  Please call us at [phone] to schedule a follow-up appointment at your convenience.  We would like to see you in about 2 weeks.  If you would prefer to follow up with us further by phone, please let us know so that we may contact you 2-3 weeks following your procedure.        Diet: Regular diet. Patients can have difficulty with constipation following surgery, due in part to the administration of narcotic medications.  If you are suffering with constipation, you should avoid foods such as hard cheeses or red meat.  Foods high in fiber are recommended along with a gentle stool softener such as Colace or MiraLax.      Activity: You should continue to be active at home, including ambulating frequently.  If possible try to limit the amount of time spent in bed.    Restrictions: You should not lift greater than 15 pounds for 4 weeks, and will want to avoid strenuous physical activity for 4 weeks.  You should limit your physical activity if it causes you discomfort; however, this should resolve within 2-4 weeks.   Walking does not count as strenuous physical activity.  You are safe to walk up and down stairs.  Following 4 weeks you may resume all normal physical activity.    Work:  You can return to work once your surgical pain has resolved.  If you perform duties that require lifting, pushing or pulling anything greater than 15 pounds, then you would have to be on light duty for the immediate 4 weeks after your surgery (if your work allows light duty).  After 4 weeks and follow-up in the office, you can return to work without restrictions.    Wound care: You may remove your dressing after a period of 24 hours.  The small white strips on the incisions act like artificial scabs, and will begin to peel at the edges at around 5-7 days.  These can then be removed.  It is normal to have a small rim of red present around the incisions. This should not, however, extend beyond 1/4 inch from the  PC to patient to inform her that today on D3 we have 6 goods, 1 fair and 1 poor.  All of the embryos except the poor are about the 8 cell stage today.  Patient reminded of what to expect for her embryos for the next few days.  She had no questions at this time and was reminded to call the clinic at any time if anything comes up.  She was reminded that we will call her with a final update on D6.   incision.  If your incisions become increasingly tender, red, or draining, please contact us.       Bathing: You may shower after 24 hours from surgery.  It is ok to get your incision wet, but avoid rubbing them.  Avoid soaking in bath tubs, or swimming in lakes, pools, or streams for 2 weeks following surgery.    Carola Browne [facility] [address] [address]  Office: [phone]  Fax: [phone]

## 2022-05-02 ENCOUNTER — HOSPITAL ENCOUNTER (EMERGENCY)
Facility: CLINIC | Age: 57
Discharge: HOME | End: 2022-05-02
Admitting: PHYSICIAN ASSISTANT
Payer: COMMERCIAL

## 2022-05-02 VITALS
BODY MASS INDEX: 19.7 KG/M2 | DIASTOLIC BLOOD PRESSURE: 93 MMHG | HEIGHT: 68 IN | SYSTOLIC BLOOD PRESSURE: 157 MMHG | OXYGEN SATURATION: 96 % | TEMPERATURE: 100.1 F | HEART RATE: 86 BPM | WEIGHT: 130 LBS | RESPIRATION RATE: 20 BRPM

## 2022-05-02 DIAGNOSIS — R51.9 NONINTRACTABLE HEADACHE, UNSPECIFIED CHRONICITY PATTERN, UNSPECIFIED HEADACHE TYPE: ICD-10-CM

## 2022-05-02 DIAGNOSIS — U07.1 INFECTION DUE TO 2019 NOVEL CORONAVIRUS: ICD-10-CM

## 2022-05-02 LAB
FLUAV RNA SPEC QL NAA+PROBE: NEGATIVE
FLUBV RNA RESP QL NAA+PROBE: NEGATIVE
SARS-COV-2 RNA RESP QL NAA+PROBE: POSITIVE

## 2022-05-02 PROCEDURE — 87636 SARSCOV2 & INF A&B AMP PRB: CPT | Performed by: PHYSICIAN ASSISTANT

## 2022-05-02 PROCEDURE — 96375 TX/PRO/DX INJ NEW DRUG ADDON: CPT

## 2022-05-02 PROCEDURE — 99284 EMERGENCY DEPT VISIT MOD MDM: CPT | Mod: 25,CS

## 2022-05-02 PROCEDURE — 96374 THER/PROPH/DIAG INJ IV PUSH: CPT

## 2022-05-02 PROCEDURE — 250N000011 HC RX IP 250 OP 636: Performed by: PHYSICIAN ASSISTANT

## 2022-05-02 PROCEDURE — 93005 ELECTROCARDIOGRAM TRACING: CPT | Performed by: EMERGENCY MEDICINE

## 2022-05-02 PROCEDURE — 250N000009 HC RX 250: Performed by: PHYSICIAN ASSISTANT

## 2022-05-02 PROCEDURE — 250N000013 HC RX MED GY IP 250 OP 250 PS 637: Performed by: PHYSICIAN ASSISTANT

## 2022-05-02 RX ORDER — DIPHENHYDRAMINE HYDROCHLORIDE 50 MG/ML
25 INJECTION INTRAMUSCULAR; INTRAVENOUS ONCE
Status: COMPLETED | OUTPATIENT
Start: 2022-05-02 | End: 2022-05-02

## 2022-05-02 RX ORDER — METOCLOPRAMIDE 5 MG/1
10 TABLET ORAL 3 TIMES DAILY PRN
Qty: 10 TABLET | Refills: 0 | Status: SHIPPED | OUTPATIENT
Start: 2022-05-02 | End: 2022-05-25

## 2022-05-02 RX ORDER — ACETAMINOPHEN 325 MG/1
975 TABLET ORAL ONCE
Status: COMPLETED | OUTPATIENT
Start: 2022-05-02 | End: 2022-05-02

## 2022-05-02 RX ORDER — KETOROLAC TROMETHAMINE 15 MG/ML
15 INJECTION, SOLUTION INTRAMUSCULAR; INTRAVENOUS ONCE
Status: COMPLETED | OUTPATIENT
Start: 2022-05-02 | End: 2022-05-02

## 2022-05-02 RX ORDER — METOCLOPRAMIDE HYDROCHLORIDE 5 MG/ML
10 INJECTION INTRAMUSCULAR; INTRAVENOUS ONCE
Status: COMPLETED | OUTPATIENT
Start: 2022-05-02 | End: 2022-05-02

## 2022-05-02 RX ORDER — HYDROXYZINE HYDROCHLORIDE 25 MG/1
25 TABLET, FILM COATED ORAL EVERY 6 HOURS PRN
Qty: 10 TABLET | Refills: 0 | Status: SHIPPED | OUTPATIENT
Start: 2022-05-02 | End: 2023-07-26

## 2022-05-02 RX ADMIN — ACETAMINOPHEN 975 MG: 325 TABLET ORAL at 14:42

## 2022-05-02 RX ADMIN — METOCLOPRAMIDE HYDROCHLORIDE 10 MG: 5 INJECTION INTRAMUSCULAR; INTRAVENOUS at 14:37

## 2022-05-02 RX ADMIN — DIPHENHYDRAMINE HYDROCHLORIDE 25 MG: 50 INJECTION, SOLUTION INTRAMUSCULAR; INTRAVENOUS at 14:37

## 2022-05-02 RX ADMIN — FAMOTIDINE 20 MG: 10 INJECTION, SOLUTION INTRAVENOUS at 14:47

## 2022-05-02 RX ADMIN — KETOROLAC TROMETHAMINE 15 MG: 15 INJECTION, SOLUTION INTRAMUSCULAR; INTRAVENOUS at 14:43

## 2022-05-02 ASSESSMENT — ENCOUNTER SYMPTOMS
CHILLS: 1
DYSURIA: 0
FATIGUE: 1
HEMATURIA: 0
COUGH: 1
NERVOUS/ANXIOUS: 0
VOMITING: 0
SORE THROAT: 0
FEVER: 1
NAUSEA: 1
DIARRHEA: 1
LIGHT-HEADEDNESS: 1
SHORTNESS OF BREATH: 0
RHINORRHEA: 1
HEADACHES: 1
ABDOMINAL PAIN: 0
MYALGIAS: 1

## 2022-05-02 NOTE — ED NOTES
Pt c/o new chest tightness and dizziness while in waiting area. Vitals repeated, ekg obtained and shown to physician.

## 2022-05-02 NOTE — DISCHARGE INSTRUCTIONS
You were seen in the emergency department today for symptoms that are suspicious for Covid-19. You did test positive today.    Take care of yourself at home:  -Rest  -Hydrate  -Isolate    For pain or fever you may take:  -Tylenol 1000 mg every 6 hours or 4 times a day.  Max 4000 mg in 24 hours  -Please do not take this medication with alcohol as it canincrease your risk of liver dysfunction.    For pain or fever that is not well controlled with Tylenol you may consider taking ibuprofen 600 mg every 6 hours.  Max 3500 mg in 24 hours.  Please do not take this medication if you have a history of a GI bleed or kidney dysfunction.     For nausea and headache you can take Reglan 10 mg 2-3 times a day.    For cough you may use over-the-counter cough medicine or cough drops.  For nasal congestion you may consider taking Mucinex with a large glass of water.    Regardless of if you have been tested or not:  Patient who have symptoms(cough, fever, or shortness of breath), need to isolate for 5 days from when symptoms started AND 24 hours after fever resolves (without fever reducing medications) AND improvement of respiratory symptoms(whichever is longer).    Isolate yourself at home (in own room/own bathroom if possible)  Do Not allow any visitors  Do Not go to work or school  Do Not go to [employer], centers, shopping, or other public places.  Do Not shake hands.  Avoid close and intimate contact with others (hugging, kissing).  Follow CDC recommendations for household cleaning of frequently touchedservices.     After the initial 5 days, continue to isolate yourself from household members as much as possible. To continue decrease the risk of community spread and exposure, you and anymembers of your household should limit activities in public for 14 days after starting home isolation.     You can reference the following CDC link for helpful home isolation/care  tips:  https://www.cdc.gov/coronavirus/2019-ncov/downloads/10Things.pdf    Protect Others:  Cover Your Mouth and Nose with a mask, disposable tissue or wash cloth to avoid spreading germs to others.  Wash your hands and face frequently with soap and water    For more informationabout COVID19 and options for caring for yourself at home, please visit the CDC website at https://www.cdc.gov/coronavirus/2019-ncov/about/steps-when-sick.html  For more options for care at [facility], please visit our website at [email or web address]    Return to the emergency department if:  -You are unable to say a full sentence without needing to stop and catch your breath  -You develop severe chest pain  -You notice your breathing has become very difficult  -You have a fever (100.4F or above) that is not going down with Tylenol and ibuprofen  - O2 saturation drops below 90% routinely  - vision changes  - fainting  - difficulty with talking or walking  Or with any other concerning symptom    Follow up with your primary care provider in the next few days to check in.

## 2022-05-02 NOTE — ED PROVIDER NOTES
Emergency Department Encounter   NAME: Jose Boyle ; AGE: 57 year old male ; YOB: 1965 ; MRN: [id number] ; EVALUATION DATE & TIME: 5/2/2022  2:09 PM ; PCP: Trish Munoz   ED PROVIDER: Antonia Robledo PA-C    Chief Complaint   Patient presents with     Covid Concern     Insomnia       Medical Decision Making & Final Diagnosis     1. Infection due to 2019 novel coronavirus    2. Nonintractable headache, unspecified chronicity pattern, unspecified headache type         ED Course as of 05/02/22 1957   Mon May 02, 2022   1436 Jose is a 57 year old male with a relevant PMH of IBS, diverticulosis, tobacco use who presents to the ED for evaluation of headache and lightheadedness.  He also has fever, myalgias, dry cough, and nausea.   1436 My exam is notable for elevated BP, temperature elevation to 100.1 and otherwise normal vital signs and a nontoxic appearing man.  Clear lung sounds.  No nuchal rigidity.  Negative Kernig's.  No scalp tenderness.  Normal gait.  Spontaneously moves all extremities.  Sensation tact light touch in all extremities.  Cranial nerves II through XII grossly intact.   1951 Patient is COVID-positive.  COVID explains all of the patient's symptoms.  Low suspicion of life-threatening or emergent source of headache.  Nothing to suggest meningitis on my exam.  No AMS, nuchal rigidity, or other meningeal findings.  Nothing to suggest GCA.  No scalp tenderness or intermittent jaw claudication.  No proximal muscle weakness.  He is low risk to have a severe anemia as source of headache especially in the context of viral syndrome.  He has no melena or bloody stools.  Do not believe there is indication for labs today with his unremarkable vital signs.  No vision changes suspicious for acute angle-closure glaucoma.  Lower suspicion of space-occupying lesion.  Headache does not increase with activities that increase intercranial pressure.  No thunderclap nature of headache or risk factors  for aneurysm rupture concerning for SAH.  Do not believe there is indication for head CT.  He does have a cough but has no chest pain or shortness of breath.  Lungs are clear.  No hypoxia, tachypnea.  He has no complaints of shortness of breath.  Do not believe there is indication for chest x-ray.  He did have some lightheadedness in the shower today where he thought he might faint.  EKG here sinus rhythm.  No acute ST elevation or depression.  No pathologic arrhythmia.  He did not describe sudden onset syncope.  He became lightheaded.  In the context of a viral syndrome with high fevers while standing and showering this is not unusual.  He did get a liter of fluids by EMS on arrival and noted improvement in lightheadedness prior to discharge.  He has not had significant GI losses or diet that would be suspicious for significant electrolyte derangement.  Given normal vital signs and well appearance as well as no decreased urination I would have a lower suspicion of something like acute renal failure.  Do not believe there is indication for labs.  Patient was given symptomatic management for headache here and was feeling improved prior to discharge.     1957 Recommended close follow-up and we reviewed strict return precautions that merit further evaluation in the ED.            ED Course   2:21 PM I met and introduced myself to the patient. I gathered initial history and performed my physical exam. We discussed plan for initial workup.  We discussed plans for discharge assuming he has some symptomatic improvement in headache here after interventions are ordered.  Patient voices understanding.  I did see the patient while wearing full COVID-compliant PPE.      MEDICATIONS GIVEN IN THE EMERGENCY:   Medications   ketorolac (TORADOL) injection 15 mg (15 mg Intravenous Given 5/2/22 1443)   metoclopramide (REGLAN) injection 10 mg (10 mg Intravenous Given 5/2/22 1437)   diphenhydrAMINE (BENADRYL) injection 25 mg (25 mg  Intravenous Given 5/2/22 1437)   acetaminophen (TYLENOL) tablet 975 mg (975 mg Oral Given 5/2/22 1442)   famotidine (PEPCID) injection 20 mg (20 mg Intravenous Given 5/2/22 1447)      NEW PRESCRIPTIONS STARTED AT TODAY'S ER VISIT:  Discharge Medication List as of 5/2/2022  2:54 PM      START taking these medications    Details   hydrOXYzine (ATARAX) 25 MG tablet Take 1 tablet (25 mg) by mouth every 6 hours as needed for anxiety (insomnia), Disp-10 tablet, R-0, Local Print      metoclopramide (REGLAN) 5 MG tablet Take 2 tablets (10 mg) by mouth 3 times daily as needed (headache, nausea), Disp-10 tablet, R-0, Local Print           =================================================================   History   Patient information was obtained from: patient   Use of Intrepreter: N/A    Jose is a 57 year old male with a relevant PMH of IBS, diverticulosis, tobacco use who presents to the ED for evaluation of headache and lightheadedness.   Patient reports myalgias, fever ranging from 100 to 102  F, headache, dry cough, intermittent nausea since Saturday evening.  He has been taking Tylenol for this with his last dose about 4 hours ago.  Headache is located all over his head and states its been constant since Saturday.  No photophobia or phonophobia.  No exacerbating features of headache.  States he has been unable to sleep for the last 3 days because of the headache.  He has had intermittent nonbloody or black diarrhea which is his baseline.  He was showering today and felt lightheaded and like he might faint.  Denies chest pain or palpitations at that time.  He was concerned so he called EMS.  He is having intermittent lightheadedness when he moves around but that has improved after fluids by EMS.  No room spinning dizziness.  Denies vision changes, sore throat, chest pain, difficulty breathing, abdominal pain, vomiting, urinary symptoms, numbness/tingling/swelling in arms or legs.  No history of VTE, recent surgery  "immobilization, cancer diagnosis or treatment.  No pain in his jaw with chewing. He has been monitoring his O2 stat at home and it has been normal  ______________________________________________________________________  Past Medical History:   Diagnosis Date     Gastritis      Hiatal hernia      Irritable bowel syndrome      Kidney stones         Past Surgical History:   Procedure Laterality Date     ANTERIOR CERVICAL DISCECTOMY W/ FUSION  2005    C4-5, C5-6     CLOSED REDUCTION, PERCUTANEOUS PINNING  HAND, COMBINED       HERNIORRHAPHY INGUINAL Left 10/22/2021    Procedure: HERNIORRHAPHY, INGUINAL, OPEN, LEFT;  Surgeon: Carola Browne DO;  Location: [facility] Main OR     IR CERVICAL EPIDURAL STEROID INJECTION  6/27/2005       Family History   Problem Relation Age of Onset     Cancer Mother      Multiple Sclerosis Mother      Heart Disease Father      Diabetes Father        Social History     Tobacco Use     Smoking status: Current Every Day Smoker     Packs/day: 1.00     Years: 35.00     Pack years: 35.00     Types: Cigarettes     Smokeless tobacco: Never Used       REVIEW OF SYSTEMS:    Review of Systems   Constitutional: Positive for chills, fatigue and fever.   HENT: Positive for rhinorrhea. Negative for sore throat.    Eyes: Negative for visual disturbance.   Respiratory: Positive for cough. Negative for shortness of breath.    Cardiovascular: Negative for chest pain and leg swelling.   Gastrointestinal: Positive for diarrhea and nausea. Negative for abdominal pain and vomiting.   Genitourinary: Negative for dysuria and hematuria.   Musculoskeletal: Positive for myalgias.   Skin: Negative for rash.   Neurological: Positive for light-headedness and headaches.   Psychiatric/Behavioral: The patient is not nervous/anxious.    All other systems reviewed and are negative.        Physical Exam   BP (!) 157/93   Pulse 86   Temp 100.1  F (37.8  C) (Oral)   Resp 20   Ht 1.727 m (5' 8\")   Wt 59 kg (130 lb)   SpO2 " 96%   BMI 19.77 kg/m      Physical Exam  Constitutional:       General: He is not in acute distress.     Appearance: Normal appearance.      Comments: Nontoxic appearing. Temp elevation to 100.1F.    HENT:      Head: Normocephalic.      Comments: No scalp tenderness.   Eyes:      Conjunctiva/sclera: Conjunctivae normal.   Neck:      Comments:  No nuchal rigidity.  Negative Kernig's.  Cardiovascular:      Rate and Rhythm: Normal rate and regular rhythm.      Heart sounds: Normal heart sounds.   Pulmonary:      Effort: Pulmonary effort is normal. No respiratory distress.      Breath sounds: Normal breath sounds. No wheezing, rhonchi or rales.   Abdominal:      General: There is no distension.      Palpations: Abdomen is soft.      Tenderness: There is no abdominal tenderness. There is no guarding or rebound.   Musculoskeletal:         General: No swelling or deformity. Normal range of motion.      Cervical back: Normal range of motion.      Right lower leg: No edema.      Left lower leg: No edema.   Skin:     General: Skin is warm.   Neurological:      General: No focal deficit present.      Mental Status: He is alert.      Comments:  Normal gait.  Spontaneously moves all extremities.  Sensation tact light touch in all extremities.  Cranial nerves II through XII grossly intact.   Psychiatric:         Mood and Affect: Mood normal.         Lab Work (Reviewed and Interpreted):   Labs Ordered and Resulted from Time of ED Arrival to Time of ED Departure   INFLUENZA A/B & SARS-COV2 PCR MULTIPLEX - Abnormal       Result Value    Influenza A PCR Negative      Influenza B PCR Negative      SARS CoV2 PCR Positive (*)        Imaging (Reviewed and Interpreted):   No orders to display       EKG (Reviewed and Interpreted):   Sinus rhythm  EKG results reviewed and interpreted by Dr. Lima, ED MD.       Antonia Robledo PA-C   Emergency Medicine   [facility] EMERGENCY ROOM  [address]  [facility] [address]  [phone]  Dept: [phone]        Antonia Robledo PA-C  05/02/22 1958

## 2022-05-02 NOTE — ED TRIAGE NOTES
Arrives to ED via [facility] with c/o COVID exposure and insomnia. Reports HA and body aches. Taking tylenol without relief. Temps 100-102F at home. Has been unable to sleep since Saturday.      Triage Assessment     Row Name 05/02/22 1214       Triage Assessment (Adult)    Airway WDL WDL       Respiratory WDL    Respiratory WDL WDL       Skin Circulation/Temperature WDL    Skin Circulation/Temperature WDL WDL       Cardiac WDL    Cardiac WDL X  HTN       Peripheral/Neurovascular WDL    Peripheral Neurovascular WDL WDL       Cognitive/Neuro/Behavioral WDL    Cognitive/Neuro/Behavioral WDL WDL

## 2022-05-16 LAB
ATRIAL RATE - MUSE: 96 BPM
DIASTOLIC BLOOD PRESSURE - MUSE: NORMAL MMHG
INTERPRETATION ECG - MUSE: NORMAL
P AXIS - MUSE: 74 DEGREES
PR INTERVAL - MUSE: 150 MS
QRS DURATION - MUSE: 78 MS
QT - MUSE: 328 MS
QTC - MUSE: 414 MS
R AXIS - MUSE: 62 DEGREES
SYSTOLIC BLOOD PRESSURE - MUSE: NORMAL MMHG
T AXIS - MUSE: 49 DEGREES
VENTRICULAR RATE- MUSE: 96 BPM

## 2022-05-25 ENCOUNTER — OFFICE VISIT (OUTPATIENT)
Dept: FAMILY MEDICINE | Facility: CLINIC | Age: 57
End: 2022-05-25
Payer: COMMERCIAL

## 2022-05-25 VITALS
OXYGEN SATURATION: 99 % | RESPIRATION RATE: 20 BRPM | WEIGHT: 143.4 LBS | DIASTOLIC BLOOD PRESSURE: 62 MMHG | BODY MASS INDEX: 21.8 KG/M2 | HEART RATE: 60 BPM | SYSTOLIC BLOOD PRESSURE: 110 MMHG | TEMPERATURE: 97.4 F

## 2022-05-25 DIAGNOSIS — H61.21 IMPACTED CERUMEN OF RIGHT EAR: Primary | ICD-10-CM

## 2022-05-25 PROCEDURE — 99213 OFFICE O/P EST LOW 20 MIN: CPT | Performed by: NURSE PRACTITIONER

## 2022-05-25 ASSESSMENT — PAIN SCALES - GENERAL: PAINLEVEL: SEVERE PAIN (6)

## 2022-05-25 NOTE — PROGRESS NOTES
Assessment & Plan     Impacted cerumen of right ear  Impacted cerumen of right ear, removed via ear lavage today.  Patient tolerated well.  No evidence of infection or other acute findings on ear exam.  He is due for annual exam and encouraged him to schedule this within the next couple of months if possible.      No follow-ups on file.    ARAM Jasmine CNP  [facility] COLLEEN Garcia is a 57 year old who presents for the following health issues : cerumen    History of Present Illness       Reason for visit:  Wax build up in ears    He eats 2-3 servings of fruits and vegetables daily.He consumes 2 sweetened beverage(s) daily.He exercises with enough effort to increase his heart rate 20 to 29 minutes per day.  He exercises with enough effort to increase his heart rate 4 days per week.   He is taking medications regularly.       Patient is here today with concerns of ear fullness and possible wax buildup.  He reports having ears flushed before.  Usually every few years he experiences decreased hearing and impacted cerumen is typically the cause.  He denies any ear pain currently.  No recent cold-like symptoms.  He denies use of Q-tips, Debrox or other over-the-counter medications.  Patient has no other concerns at this time.    Review of Systems   Review of Systems - pertinent positives noted in HPI, otherwise ROS is negative.        Objective    /62 (BP Location: Right arm, Patient Position: Sitting, Cuff Size: Adult Regular)   Pulse 60   Temp 97.4  F (36.3  C)   Resp 20   Wt 65 kg (143 lb 6.4 oz)   SpO2 99%   BMI 21.80 kg/m    Body mass index is 21.8 kg/m .  Physical Exam     General Appearance:  Alert, cooperative, no distress, appears stated age   HEENT:   Impacted cerumen of right ear canal noted.  Left ear canal appears normal.  Tympanic membranes appear normal bilaterally.  HEENT exam otherwise normal.   Neck: Supple, symmetrical, no adenopathy.   Lungs:   Clear  to auscultation bilaterally, respirations unlabored.     Heart:  Regular rate and rhythm, S1, S2 normal, no murmur, rub or gallop   Skin: Warm, dry.  Skin color, texture, turgor normal, no rashes or lesions

## 2022-06-11 ENCOUNTER — HEALTH MAINTENANCE LETTER (OUTPATIENT)
Age: 57
End: 2022-06-11

## 2022-06-20 DIAGNOSIS — R05.9 COUGH: ICD-10-CM

## 2022-06-20 RX ORDER — FLUTICASONE PROPIONATE 220 UG/1
1 AEROSOL, METERED RESPIRATORY (INHALATION) 2 TIMES DAILY
Qty: 12 G | Refills: 4 | Status: SHIPPED | OUTPATIENT
Start: 2022-06-20 | End: 2023-07-26

## 2022-08-08 ENCOUNTER — TRANSFERRED RECORDS (OUTPATIENT)
Dept: HEALTH INFORMATION MANAGEMENT | Facility: CLINIC | Age: 57
End: 2022-08-08

## 2022-10-22 ENCOUNTER — HEALTH MAINTENANCE LETTER (OUTPATIENT)
Age: 57
End: 2022-10-22

## 2022-12-21 ENCOUNTER — E-VISIT (OUTPATIENT)
Dept: FAMILY MEDICINE | Facility: CLINIC | Age: 57
End: 2022-12-21
Payer: COMMERCIAL

## 2022-12-21 ENCOUNTER — NURSE TRIAGE (OUTPATIENT)
Dept: FAMILY MEDICINE | Facility: CLINIC | Age: 57
End: 2022-12-21

## 2022-12-21 DIAGNOSIS — J06.9 VIRAL URI WITH COUGH: Primary | ICD-10-CM

## 2022-12-21 PROCEDURE — 99421 OL DIG E/M SVC 5-10 MIN: CPT | Performed by: FAMILY MEDICINE

## 2022-12-21 NOTE — PATIENT INSTRUCTIONS
"  Dear Jose Boyle    After reviewing your responses, I've been able to diagnose you with \"Bronchitis\" which is a common infection of your lungs that is nearly always caused by a virus. The virus causes swelling and irritation of the air passages of your lungs which leads to cough. The illness spreads from your nose and throat to your windpipe and airways. It is often called a \"chest cold\" and can last up to 2 weeks, but is not a serious illness. Exposure to cigarette smoke usually makes this significantly worse.     COVID-19 and influenza are 2 types of viruses that are known to cause bronchitis symptoms.  Both of these viruses have potential antiviral treatments when detected early.  For that reason, I recommend testing for influenza and COVID virus.  You may schedule a lab only visit to have these test performed today.     To treat bronchitis, the main thing to do is drink lots of fluids and rest. Cough medications over-the-counter such as mucinex, robitussin or \"cold and sinus\" medications can be helpful. Ibuprofen and Tylenol also help with fevers or aching feelings that you often have with this kind of illness. Do not take ibuprofen if you have kidney disease, stomach ulcers or allergy to aspirin.     Bronchitis is most often highly contagious as viruses are spread through the air or touch. Avoid contact with others who may become infected, particularly children, the elderly and those whose immune systems might be weak.     If your symptoms worsen, you develop chest pain or shortness of breath, fevers over 101, or are not improving in 5 days, please contact your primary care provider for an appointment or visit any of our convenient Walk-in Care or Urgent Care Centers to be seen which can be found on our website here.    Thanks again for choosing us as your health care partner,    Trish Munoz MD  "

## 2022-12-21 NOTE — TELEPHONE ENCOUNTER
E-visit completed.  No red flag symptoms suggestive of bacterial etiology and therefore antibiotics not indicated.  Recommended symptomatic treatments.  VJ

## 2022-12-21 NOTE — TELEPHONE ENCOUNTER
"Patient reported symptoms to be of bronchitis.  Appears alert and oriented over the phone with no respiratory distress.  Noted dry coughing a couple of times during the assessment.  Pat undecided what treatment is appropriate for symptoms.  Awaiting for provider to respond via E-visit requested earlier.   Past smoker.  Quit this past April.    Dry cough with some phlegm - clear and thin    Please advise.    WIN DuffN, RN  [facility]       Reason for Disposition    Cough and there is no fever    Additional Information    Negative: SEVERE difficulty breathing (e.g., struggling for each breath, speaks in single words)    Negative: Bluish (or gray) lips or face    Negative: Shock suspected (e.g., cold/pale/clammy skin, too weak to stand, low BP, rapid pulse)    Negative: Sounds like a life-threatening emergency to the triager    Negative: Symptoms of COVID-19 (e.g., cough, fever, SOB, or others) and lives in an area with community spread    Negative: Sounds like a cold and there is no fever    Answer Assessment - Initial Assessment Questions  1. WORST SYMPTOM: \"What is your worst symptom?\" (e.g., cough, runny nose, muscle aches, headache, sore throat, fever)       Patient reported no fever, but coughing and stuffy nose are the worst  2. ONSET: \"When did your flu symptoms start?\"       Symptoms started yesterday at 0330 on 12/20/2022  3. COUGH: \"How bad is the cough?\"        Scale of 0-10, 10 being the worst is at a 6/10  4. RESPIRATORY DISTRESS: \"Describe your breathing.\"       Breathing is fine.  Just stuffy nose.  O2 sat at 95% RA  5. FEVER: \"Do you have a fever?\" If Yes, ask: \"What is your temperature, how was it measured, and when did it start?\"      No fever.  It feels normal.  Body temperature feels normal  6. EXPOSURE: \"Were you exposed to someone with influenza?\"        No  7. FLU VACCINE: \"Did you get a flu shot this year?\"      No, not yet  8. HIGH RISK DISEASE: \"Do you have any " "chronic medical problems?\" (e.g., heart or lung disease, asthma, weak immune system, or other HIGH RISK conditions)      No  9. PREGNANCY: \"Is there any chance you are pregnant?\" \"When was your last menstrual period?\"      n/a  10. OTHER SYMPTOMS: \"Do you have any other symptoms?\"  (e.g., runny nose, muscle aches, headache, sore throat)        Running nose, body aches, intermitten headaches hsore throat when coughing and chest congestion when coughing.    Protocols used: INFLUENZA - SEASONAL-A-OH      "

## 2023-06-18 ENCOUNTER — HEALTH MAINTENANCE LETTER (OUTPATIENT)
Age: 58
End: 2023-06-18

## 2023-07-26 ENCOUNTER — OFFICE VISIT (OUTPATIENT)
Dept: FAMILY MEDICINE | Facility: CLINIC | Age: 58
End: 2023-07-26
Payer: COMMERCIAL

## 2023-07-26 ENCOUNTER — ANCILLARY PROCEDURE (OUTPATIENT)
Dept: GENERAL RADIOLOGY | Facility: CLINIC | Age: 58
End: 2023-07-26
Attending: NURSE PRACTITIONER
Payer: COMMERCIAL

## 2023-07-26 VITALS
BODY MASS INDEX: 21.96 KG/M2 | DIASTOLIC BLOOD PRESSURE: 70 MMHG | RESPIRATION RATE: 16 BRPM | TEMPERATURE: 97.8 F | HEART RATE: 81 BPM | WEIGHT: 144.4 LBS | SYSTOLIC BLOOD PRESSURE: 110 MMHG | OXYGEN SATURATION: 92 %

## 2023-07-26 DIAGNOSIS — Z72.0 TOBACCO USER: ICD-10-CM

## 2023-07-26 DIAGNOSIS — R07.89 STERNAL PAIN: Primary | ICD-10-CM

## 2023-07-26 DIAGNOSIS — R07.89 STERNAL PAIN: ICD-10-CM

## 2023-07-26 DIAGNOSIS — K21.9 GASTROESOPHAGEAL REFLUX DISEASE WITHOUT ESOPHAGITIS: ICD-10-CM

## 2023-07-26 DIAGNOSIS — K44.9 DIAPHRAGMATIC HERNIA WITHOUT OBSTRUCTION OR GANGRENE: ICD-10-CM

## 2023-07-26 PROCEDURE — 93005 ELECTROCARDIOGRAM TRACING: CPT | Performed by: NURSE PRACTITIONER

## 2023-07-26 PROCEDURE — 99214 OFFICE O/P EST MOD 30 MIN: CPT | Performed by: NURSE PRACTITIONER

## 2023-07-26 PROCEDURE — 71046 X-RAY EXAM CHEST 2 VIEWS: CPT | Mod: TC | Performed by: RADIOLOGY

## 2023-07-26 PROCEDURE — 93010 ELECTROCARDIOGRAM REPORT: CPT | Performed by: INTERNAL MEDICINE

## 2023-07-26 ASSESSMENT — PATIENT HEALTH QUESTIONNAIRE - PHQ9
SUM OF ALL RESPONSES TO PHQ QUESTIONS 1-9: 0
10. IF YOU CHECKED OFF ANY PROBLEMS, HOW DIFFICULT HAVE THESE PROBLEMS MADE IT FOR YOU TO DO YOUR WORK, TAKE CARE OF THINGS AT HOME, OR GET ALONG WITH OTHER PEOPLE: NOT DIFFICULT AT ALL
SUM OF ALL RESPONSES TO PHQ QUESTIONS 1-9: 0

## 2023-07-26 ASSESSMENT — PAIN SCALES - GENERAL: PAINLEVEL: NO PAIN (0)

## 2023-07-26 NOTE — PROGRESS NOTES
Assessment & Plan     Sternal pain  We discussed differential diagnoses of sternal pain.  Chest x-ray obtained due to's history of cigarette use.  This is unremarkable.  EKG obtained as well which is without any significant findings.   I suspect discomfort is related to upper GI etiology, possibly due to diaphragmatic hernia.  I recommend following up with gastroenterologist for repeat upper endoscopy to ensure stability there.  We discussed starting omeprazole daily for the next 2 weeks to see if this helps as well.  He is due for his annual exam and I encouraged him to schedule this.   - XR Chest 2 Views  - EKG 12-lead, tracing only    Diaphragmatic hernia without obstruction or gangrene  Reviewed history of diaphragmatic hernia    Gastroesophageal reflux disease without esophagitis  As above, recommend patient's start omeprazole daily for 2 weeks.  He will follow-up with his gastroenterologist.    Tobacco user  Patient with smoking cigarettes about 3 years ago.  He continues to vape and I recommend that he work on cessation of this as well.    ARAM Jasmine [facility] COLLEEN Garcia is a 58 year old, presenting for the following health issues:  No chief complaint on file.      HPI   Patient is here today for evaluation of sternal pain.  His symptoms have been going on for the last 6 months or so.  Episodes have occurred 4-5 times over the past 6 months.  The most recent episode of sternal pain was the most concerning to him.  He describes symptoms of chest wall discomfort coming on suddenly with certain movements.  Typically he has noticed this happening after he has been in a relaxing or slouching position.  When he gets up he feels a pulling discomfort in his sternum.  Typically this discomfort lasts about 5 seconds.  When it happened last week symptoms lasted for about 20 seconds which made him nervous.  He describes it as a pulling sensation.  He denies any shortness  of breath or chest pain otherwise.  No nausea, vomiting or change in his stools.  He has followed with gastroenterology previously.  He had an upper endoscopy in August 2022 which indicated a hiatal hernia.  He denies history of abdominal surgeries.  He takes omeprazole on an as-needed basis for symptoms of reflux.  He denies any reflux or epigastric pain currently.        Review of Systems   Review of Systems - pertinent positives noted in HPI, otherwise ROS is negative.        Objective    There were no vitals taken for this visit.  There is no height or weight on file to calculate BMI.  Physical Exam     General Appearance:    Alert, cooperative, no distress, appears stated age.     Head:    Normocephalic, without obvious abnormality, atraumatic   Lungs:     Clear to auscultation bilaterally, respirations unlabored   Chest wall:    No tenderness or deformity   Heart:    Regular rate and rhythm, S1 and S2 normal, no murmur, rub   or gallop   Abdomen:     Soft, non-tender, bowel sounds active all four quadrants,     no masses, no organomegaly.     Extremities:   Extremities normal, atraumatic, no cyanosis or edema   Pulses:   2+ and symmetric all extremities   Skin:   Skin color, texture, turgor normal, no rashes or lesions   Neurologic:   Grossly intact       CXR - Reviewed and interpreted by me Normal- no infiltrates, effusions, pneumothoraces, cardiomegaly or masses  EKG - Reviewed and interpreted by me appears normal, NSR, normal axis, normal intervals, no acute ST/T changes c/w ischemia, no LVH by voltage criteria, unchanged from previous tracings            Answers submitted by the patient for this visit:  Patient Health Questionnaire (Submitted on 7/26/2023)  If you checked off any problems, how difficult have these problems made it for you to do your work, take care of things at home, or get along with other people?: Not difficult at all  PHQ9 TOTAL SCORE: 0

## 2023-07-27 LAB
ATRIAL RATE - MUSE: 54 BPM
DIASTOLIC BLOOD PRESSURE - MUSE: NORMAL MMHG
INTERPRETATION ECG - MUSE: NORMAL
P AXIS - MUSE: 61 DEGREES
PR INTERVAL - MUSE: 158 MS
QRS DURATION - MUSE: 80 MS
QT - MUSE: 430 MS
QTC - MUSE: 407 MS
R AXIS - MUSE: 32 DEGREES
SYSTOLIC BLOOD PRESSURE - MUSE: NORMAL MMHG
T AXIS - MUSE: 38 DEGREES
VENTRICULAR RATE- MUSE: 54 BPM

## 2024-08-11 ENCOUNTER — HEALTH MAINTENANCE LETTER (OUTPATIENT)
Age: 59
End: 2024-08-11

## 2024-11-08 NOTE — TELEPHONE ENCOUNTER
Provider E-Visit time total (minutes): 4  
Which Photosensitizer Was Used: Levulan
Who Performed The Pdt (Staff): Jessie Ayoub MA/ Joe CHAWLA
Anesthesia Type: 1% lidocaine with epinephrine
Detail Level: Zone
Lot # (Optional): [id number]
Treatment Number: 0
Show Medical Necessity In Plan?: Yes
Debridement Text (Will Only Render In Visit Note If You Select Debridement Option Under Who Performed The Pdt Field): Prior to application of the photodynamic medication the hyperkeratotic lesions were curetted to make them more amenable to therapy.
Medical Necessity: Precancerous Lesions
Expiration Date (Optional): 03/2027
Occlusion: No
Illumination Time: 00:16:40
Pre-Procedure Text: The treatment areas were cleaned and prepped in the usual fashion.
Ndc# (Optional): [id number]
Light Source: [id number]
Who Performed The Pdt?: Performed by Nurse, MA or Aesthetician ([id number])
Consent: Written consent obtained.  The risks were reviewed with the patient including but not limited to: pigmentary changes, pain, blistering, scabbing, redness, and the remote possibility of scarring.
Incubation Time: 02:00:00
Post-Care Instructions: I reviewed with the patient in detail post-care instructions. Patient is to avoid sunlight for the next 2 days, and wear sun protection. Patients may expect sunburn like redness, discomfort and scabbing.
Number Of Kerasticks/Tubes Billed For: 2

## 2025-08-16 ENCOUNTER — HEALTH MAINTENANCE LETTER (OUTPATIENT)
Age: 60
End: 2025-08-16

## (undated) DEVICE — GLOVE BIOGEL PI ULTRATOUCH G SZ 6.0 42160

## (undated) DEVICE — SUTURE VICRYL+ 4-0 UNDYED PS-2 VCP496H

## (undated) DEVICE — DRAPE OR LAPAROTOMY KC 89228*

## (undated) DEVICE — SOL WATER IRRIG 1000ML BOTTLE 2F7114

## (undated) DEVICE — BLADE CLIPPER PIVOT PURPLE DISP 9660

## (undated) DEVICE — SU VICRYL+ 3-0 27IN SH UND VCP416H

## (undated) DEVICE — SU ETHIBOND 2-0 SH 30" X833H

## (undated) DEVICE — DECANTER VIAL 2006S

## (undated) DEVICE — TUBE PENROSE 3/8 X 12 STRL LTX 0912020

## (undated) DEVICE — SUCTION TIP YANKAUER W/O VENT K86

## (undated) DEVICE — SYR 10ML LL W/O NDL 302995

## (undated) DEVICE — SOL ADH LIQUID BENZOIN SWAB 0.6ML C1544

## (undated) DEVICE — PLATE GROUNDING ADULT W/CORD 9165L

## (undated) DEVICE — CUSTOM PACK GEN MAJOR SBA5BGMHEA

## (undated) DEVICE — PREP DURAPREP 26ML APL 8630

## (undated) DEVICE — SOL NACL 0.9% IRRIG 1000ML BOTTLE 2F7124

## (undated) DEVICE — MITT PRE-OP 7 L X 5 1/2 W 5177M1

## (undated) DEVICE — DRSG GAUZE 4X4" 3033

## (undated) DEVICE — DRSG TEGADERM 4X4 3/4" 1626W

## (undated) DEVICE — BLADE KNIFE SURG 15 371115

## (undated) DEVICE — GOWN IMPERVIOUS BREATHABLE SMART LG 89015

## (undated) DEVICE — ESU PENCIL SMOKE EVAC W/ROCKER SWITCH 0703-047-000

## (undated) DEVICE — NEEDLE HYPO 22X1-1/2 SAFETY 305900